# Patient Record
Sex: MALE | Race: WHITE | NOT HISPANIC OR LATINO | Employment: FULL TIME | URBAN - METROPOLITAN AREA
[De-identification: names, ages, dates, MRNs, and addresses within clinical notes are randomized per-mention and may not be internally consistent; named-entity substitution may affect disease eponyms.]

---

## 2017-04-24 ENCOUNTER — TRANSFERRED RECORDS (OUTPATIENT)
Dept: HEALTH INFORMATION MANAGEMENT | Facility: CLINIC | Age: 52
End: 2017-04-24

## 2017-05-26 ENCOUNTER — OFFICE VISIT (OUTPATIENT)
Dept: INTERNAL MEDICINE | Facility: CLINIC | Age: 52
End: 2017-05-26
Payer: COMMERCIAL

## 2017-05-26 VITALS
WEIGHT: 168 LBS | HEIGHT: 69 IN | OXYGEN SATURATION: 98 % | SYSTOLIC BLOOD PRESSURE: 128 MMHG | HEART RATE: 65 BPM | TEMPERATURE: 97.8 F | BODY MASS INDEX: 24.88 KG/M2 | DIASTOLIC BLOOD PRESSURE: 68 MMHG

## 2017-05-26 DIAGNOSIS — R20.2 PARESTHESIA: ICD-10-CM

## 2017-05-26 DIAGNOSIS — E78.00 HYPERCHOLESTEREMIA: Primary | ICD-10-CM

## 2017-05-26 DIAGNOSIS — R79.89 LOW TESTOSTERONE: ICD-10-CM

## 2017-05-26 DIAGNOSIS — E78.5 HYPERLIPIDEMIA LDL GOAL <130: ICD-10-CM

## 2017-05-26 DIAGNOSIS — N52.9 ERECTILE DYSFUNCTION, UNSPECIFIED ERECTILE DYSFUNCTION TYPE: ICD-10-CM

## 2017-05-26 DIAGNOSIS — Z00.00 ROUTINE GENERAL MEDICAL EXAMINATION AT A HEALTH CARE FACILITY: ICD-10-CM

## 2017-05-26 LAB
ERYTHROCYTE [DISTWIDTH] IN BLOOD BY AUTOMATED COUNT: 13.4 % (ref 10–15)
HCT VFR BLD AUTO: 44 % (ref 40–53)
HGB BLD-MCNC: 15 G/DL (ref 13.3–17.7)
MCH RBC QN AUTO: 31.2 PG (ref 26.5–33)
MCHC RBC AUTO-ENTMCNC: 34.1 G/DL (ref 31.5–36.5)
MCV RBC AUTO: 92 FL (ref 78–100)
PLATELET # BLD AUTO: 195 10E9/L (ref 150–450)
RBC # BLD AUTO: 4.81 10E12/L (ref 4.4–5.9)
VIT B12 SERPL-MCNC: 1968 PG/ML (ref 193–986)
WBC # BLD AUTO: 5.3 10E9/L (ref 4–11)

## 2017-05-26 PROCEDURE — 36415 COLL VENOUS BLD VENIPUNCTURE: CPT | Performed by: INTERNAL MEDICINE

## 2017-05-26 PROCEDURE — 80053 COMPREHEN METABOLIC PANEL: CPT | Performed by: INTERNAL MEDICINE

## 2017-05-26 PROCEDURE — 85027 COMPLETE CBC AUTOMATED: CPT | Performed by: INTERNAL MEDICINE

## 2017-05-26 PROCEDURE — G0103 PSA SCREENING: HCPCS | Performed by: INTERNAL MEDICINE

## 2017-05-26 PROCEDURE — 82607 VITAMIN B-12: CPT | Performed by: INTERNAL MEDICINE

## 2017-05-26 PROCEDURE — 80061 LIPID PANEL: CPT | Performed by: INTERNAL MEDICINE

## 2017-05-26 PROCEDURE — 84270 ASSAY OF SEX HORMONE GLOBUL: CPT | Performed by: INTERNAL MEDICINE

## 2017-05-26 PROCEDURE — 84403 ASSAY OF TOTAL TESTOSTERONE: CPT | Performed by: INTERNAL MEDICINE

## 2017-05-26 PROCEDURE — 99214 OFFICE O/P EST MOD 30 MIN: CPT | Performed by: INTERNAL MEDICINE

## 2017-05-26 PROCEDURE — 84443 ASSAY THYROID STIM HORMONE: CPT | Performed by: INTERNAL MEDICINE

## 2017-05-26 RX ORDER — SILDENAFIL 100 MG/1
50-100 TABLET, FILM COATED ORAL DAILY PRN
Qty: 12 TABLET | Refills: 11 | Status: SHIPPED | OUTPATIENT
Start: 2017-05-26 | End: 2018-06-22

## 2017-05-26 RX ORDER — SIMVASTATIN 20 MG
20 TABLET ORAL AT BEDTIME
Qty: 90 TABLET | Refills: 3 | Status: SHIPPED | OUTPATIENT
Start: 2017-05-26 | End: 2018-03-21

## 2017-05-26 RX ORDER — TESTOSTERONE GEL, 1% 10 MG/G
3 GEL TRANSDERMAL DAILY
Qty: 150 G | Refills: 5 | COMMUNITY
Start: 2017-05-26 | End: 2021-12-03

## 2017-05-26 RX ORDER — SILDENAFIL 100 MG/1
50-100 TABLET, FILM COATED ORAL DAILY PRN
Qty: 12 TABLET | Refills: 3 | Status: SHIPPED | OUTPATIENT
Start: 2017-05-26 | End: 2018-06-22

## 2017-05-26 NOTE — MR AVS SNAPSHOT
"              After Visit Summary   5/26/2017    Anant Mena    MRN: [id number]           Patient Information     Date Of Birth          1965        Visit Information        Provider Department      5/26/2017 11:00 AM Elgin Patel MD [facility]        Today's Diagnoses     Hypercholesteremia    -  1    Erectile dysfunction, unspecified erectile dysfunction type        Paresthesia        Routine general medical examination at a health care facility        Hyperlipidemia LDL goal <130        Low testosterone           Follow-ups after your visit        Who to contact     If you have questions or need follow up information about today's clinic visit or your schedule please contact [facility] directly at [phone].  Normal or non-critical lab and imaging results will be communicated to you by MyChart, letter or phone within 4 business days after the clinic has received the results. If you do not hear from us within 7 days, please contact the clinic through MyChart or phone. If you have a critical or abnormal lab result, we will notify you by phone as soon as possible.  Submit refill requests through [email or web address] or call your pharmacy and they will forward the refill request to us. Please allow 3 business days for your refill to be completed.          Additional Information About Your Visit        MyChart Information     [email or web address] lets you send messages to your doctor, view your test results, renew your prescriptions, schedule appointments and more. To sign up, go to [email or web address] . Click on \"Log in\" on the left side of the screen, which will take you to the Welcome page. Then click on \"Sign up Now\" on the right side of the page.     You will be asked to enter the access code listed below, as well as some personal information. Please follow the directions to create your username and password.     Your access code is: [email or web address]  Expires: 8/24/2017 11:36 AM     Your " "access code will  in 90 days. If you need help or a new code, please call your [place] clinic or [phone].        Care EveryWhere ID     This is your Care EveryWhere ID. This could be used by other organizations to access your [place] medical records  [id number]        Your Vitals Were     Pulse Temperature Height Pulse Oximetry BMI (Body Mass Index)       65 97.8  F (36.6  C) (Oral) 5' 8.5\" (1.74 m) 98% 25.17 kg/m2        Blood Pressure from Last 3 Encounters:   17 128/68   16 116/72   05/22/15 126/78    Weight from Last 3 Encounters:   17 168 lb (76.2 kg)   16 168 lb 9.6 oz (76.5 kg)   05/22/15 169 lb 1.6 oz (76.7 kg)              We Performed the Following     CBC with platelets     Comprehensive metabolic panel     Lipid panel reflex to direct LDL     Prostate spec antigen screen     Testosterone Free and Total     TSH with free T4 reflex     Vitamin B12          Today's Medication Changes          These changes are accurate as of: 17 11:36 AM.  If you have any questions, ask your nurse or doctor.               Start taking these medicines.        Dose/Directions    * sildenafil 100 MG cap/tab   Commonly known as:  REVATIO/VIAGRA   Used for:  Hypercholesteremia   Started by:  Elgin Patel MD        Dose:   mg   Take 0.5-1 tablets ( mg) by mouth daily as needed for erectile dysfunction Take 30 min to 4 hours before intercourse.  Never use with nitroglycerin, terazosin or doxazosin.   Quantity:  12 tablet   Refills:  3       * sildenafil 100 MG cap/tab   Commonly known as:  REVATIO/VIAGRA   Used for:  Erectile dysfunction, unspecified erectile dysfunction type   Started by:  Elgin Patel MD        Dose:   mg   Take 0.5-1 tablets ( mg) by mouth daily as needed for erectile dysfunction Take 30 min to 4 hours before intercourse.  Never use with nitroglycerin, terazosin or doxazosin.   Quantity:  12 tablet   Refills:  11       * Notice:  " This list has 2 medication(s) that are the same as other medications prescribed for you. Read the directions carefully, and ask your doctor or other care provider to review them with you.         Where to get your medicines      These medications were sent to [facility] - [place], [place] - [address] AT Portal to [facility]  [address], [place] [address]     Phone:  [phone]     simvastatin 20 MG tablet         Some of these will need a paper prescription and others can be bought over the counter.  Ask your nurse if you have questions.     Bring a paper prescription for each of these medications     sildenafil 100 MG cap/tab    sildenafil 100 MG cap/tab                Primary Care Provider Office Phone # Fax #    Elgin Patel [phone]       [facility] [address] [facility] [address]        Thank you!     Thank you for choosing [facility]  for your care. Our goal is always to provide you with excellent care. Hearing back from our patients is one way we can continue to improve our services. Please take a few minutes to complete the written survey that you may receive in the mail after your visit with us. Thank you!             Your Updated Medication List - Protect others around you: Learn how to safely use, store and throw away your medicines at www.disposemymeds.org.          This list is accurate as of: 5/26/17 11:36 AM.  Always use your most recent med list.                   Brand Name Dispense Instructions for use    ANDROGEL 1% PUMP 12.5 MG/ACT (1%) gel   Generic drug:  testosterone     150 g    Place 3 pumps (37.5 mg) onto the skin daily Apply from dispenser to clean, dry, intact skin of the shoulders, upper arms, or abdomen.       aspirin 81 MG tablet      Take by mouth daily       cetirizine 10 MG tablet    ZYRTEC    30 tablet    Take 1 tablet by mouth daily as needed.       cholecalciferol  1000 UNIT tablet    vitamin D     Take 1 tablet by mouth daily.       CIALIS PO      Take 5 mg by mouth daily       FISH OIL PO      Take  by mouth.       fluticasone 50 MCG/ACT spray    FLONASE    3 Package    Spray 1-2 sprays into both nostrils daily.       ibuprofen 200 MG capsule     120 capsule    Take 200 mg by mouth every 4 hours as needed for fever       ketoconazole 2 % shampoo    NIZORAL         metroNIDAZOLE 1 % gel    METROGEL    60 g    Apply topically daily       omeprazole 20 MG tablet    priLOSEC OTC    30 tablet    Take 1 tablet (20 mg) by mouth daily as needed       ranitidine 150 MG tablet    ZANTAC    60 tablet    Take 1 tablet (150 mg) by mouth 2 times daily       * sildenafil 100 MG cap/tab    REVATIO/VIAGRA    12 tablet    Take 0.5-1 tablets ( mg) by mouth daily as needed for erectile dysfunction Take 30 min to 4 hours before intercourse.  Never use with nitroglycerin, terazosin or doxazosin.       * sildenafil 100 MG cap/tab    REVATIO/VIAGRA    12 tablet    Take 0.5-1 tablets ( mg) by mouth daily as needed for erectile dysfunction Take 30 min to 4 hours before intercourse.  Never use with nitroglycerin, terazosin or doxazosin.       simvastatin 20 MG tablet    ZOCOR    90 tablet    Take 1 tablet (20 mg) by mouth At Bedtime at bedtime.       * Notice:  This list has 2 medication(s) that are the same as other medications prescribed for you. Read the directions carefully, and ask your doctor or other care provider to review them with you.

## 2017-05-26 NOTE — LETTER
[facility]  [address], [address]  [place]  [address]      May 31, 2017    Anant Mena                                                                                                                                                       [address] [facility] [address]              Dear Anant,      The results of your recent tests were within normal.      Enclosed is a copy of the results.  It was a pleasure to see you at your last appointment.    If you have any questions or concerns, please contact our office at [phone].        Sincerely,      Elgin Patel M.D.

## 2017-05-26 NOTE — PROGRESS NOTES
SUBJECTIVE:                                                    Anant Mena is a 51 year old male who presents to clinic today for the following health issues:      Hyperlipidemia Follow-Up      Rate your low fat/cholesterol diet?: fair    Taking statin?  Yes, no muscle aches from statin    Other lipid medications/supplements?:  Fish oil/Omega 3, without side effects   Has H/O hyperlipidemia. On medical treatment and diet. No side effects. No muscle weakness, myalgias or upset stomach.     Has h/o GERD on Zantac treatment. symptoms are controlled. No nausea, vomiting, heartburns, bloating.      Amount of exercise or physical activity: 3 days/week for an average of 50 minutes    Problems taking medications regularly: No    Medication side effects: none    Diet: low fat/cholesterol      PROBLEMS TO ADD ON...  Has h/o ED and low testosterone. Follows with urology. On Androgel, Cialis, PRN Viagra.     Problem list and histories reviewed & adjusted, as indicated.  Additional history: as documented    Patient Active Problem List   Diagnosis     Hypercholesteremia     GERD (gastroesophageal reflux disease)     Back pain     Insomnia     Low back pain     Lumbar radiculopathy     HYPERLIPIDEMIA LDL GOAL <130     Anxiety     Low testosterone     Past Surgical History:   Procedure Laterality Date     COLONOSCOPY  12/24/2013    Procedure: COLONOSCOPY;  COLONOSCOPY       ESOPHAGOSCOPY, GASTROSCOPY, DUODENOSCOPY (EGD), COMBINED  8/16/2013    Procedure: COMBINED ESOPHAGOSCOPY, GASTROSCOPY, DUODENOSCOPY (EGD);  ESOPHAGOSCOPY, GASTROSCOPY, DUODENOSCOPY ;  Surgeon: Randy Ohara MD;  Location:  GI     KNEE SURGERY  4/2015    Lt Meniscus Repair : Dr. Philip Slade       Social History   Substance Use Topics     Smoking status: Never Smoker     Smokeless tobacco: Never Used     Alcohol use Yes      Comment: ocas     Family History   Problem Relation Age of Onset     OSTEOPOROSIS Mother      born 1936 and HTN     Alzheimer  Disease Father      born 1931and DM     DIABETES Brother      Breast Cancer Sister      BRACA 2 positive     Family History Negative Sister      Family History Negative Sister      Family History Negative Sister      Family History Negative Daughter      Respiratory Daughter      asthma         Current Outpatient Prescriptions   Medication Sig Dispense Refill     simvastatin (ZOCOR) 20 MG tablet Take 1 tablet (20 mg) by mouth At Bedtime at bedtime. 90 tablet 3     sildenafil (REVATIO/VIAGRA) 100 MG cap/tab Take 0.5-1 tablets ( mg) by mouth daily as needed for erectile dysfunction Take 30 min to 4 hours before intercourse.  Never use with nitroglycerin, terazosin or doxazosin. 12 tablet 3     sildenafil (REVATIO/VIAGRA) 100 MG cap/tab Take 0.5-1 tablets ( mg) by mouth daily as needed for erectile dysfunction Take 30 min to 4 hours before intercourse.  Never use with nitroglycerin, terazosin or doxazosin. 12 tablet 11     Tadalafil (CIALIS PO) Take 5 mg by mouth daily       ketoconazole (NIZORAL) 2 % shampoo   5     aspirin 81 MG tablet Take by mouth daily       ranitidine (ZANTAC) 150 MG tablet Take 1 tablet (150 mg) by mouth 2 times daily 60 tablet 3     omeprazole (PRILOSEC OTC) 20 MG tablet Take 1 tablet (20 mg) by mouth daily as needed 30 tablet 5     ibuprofen 200 MG capsule Take 200 mg by mouth every 4 hours as needed for fever 120 capsule 3     metroNIDAZOLE (METROGEL) 1 % gel Apply topically daily 60 g 11     fluticasone (FLONASE) 50 MCG/ACT nasal spray Spray 1-2 sprays into both nostrils daily. 3 Package 3     cetirizine (ZYRTEC) 10 MG tablet Take 1 tablet by mouth daily as needed. 30 tablet 5     Omega-3 Fatty Acids (FISH OIL PO) Take  by mouth.       cholecalciferol (VITAMIN D) 1000 UNIT tablet Take 1 tablet by mouth daily.       [DISCONTINUED] simvastatin (ZOCOR) 20 MG tablet Take 1 tablet (20 mg) by mouth At Bedtime at bedtime. 90 tablet 3       Reviewed and updated as needed this visit by  "clinical staff       Reviewed and updated as needed this visit by Provider         ROS:  Constitutional, HEENT, cardiovascular, pulmonary, gi and gu systems are negative, except as otherwise noted.    OBJECTIVE:                                                    /68  Pulse 65  Temp 97.8  F (36.6  C) (Oral)  Ht 5' 8.5\" (1.74 m)  Wt 168 lb (76.2 kg)  SpO2 98%  BMI 25.17 kg/m2  Body mass index is 25.17 kg/(m^2).  GENERAL: healthy, alert and no distress  NECK: no adenopathy, no asymmetry, masses, or scars and thyroid normal to palpation  RESP: lungs clear to auscultation - no rales, rhonchi or wheezes  CV: regular rate and rhythm, normal S1 S2, no S3 or S4, no murmur, click or rub, no peripheral edema and peripheral pulses strong  ABDOMEN: soft, nontender, no hepatosplenomegaly, no masses and bowel sounds normal  MS: no gross musculoskeletal defects noted, no edema    Diagnostic Test Results:  none      ASSESSMENT/PLAN:                                                      Problem List Items Addressed This Visit     Hypercholesteremia - Primary    Relevant Medications    simvastatin (ZOCOR) 20 MG tablet    sildenafil (REVATIO/VIAGRA) 100 MG cap/tab    HYPERLIPIDEMIA LDL GOAL <130    Relevant Medications    simvastatin (ZOCOR) 20 MG tablet    Low testosterone    Relevant Orders    Testosterone Free and Total      Other Visit Diagnoses     Erectile dysfunction, unspecified erectile dysfunction type        Relevant Medications    sildenafil (REVATIO/VIAGRA) 100 MG cap/tab    Paresthesia        Routine general medical examination at a health care facility               Assess lab work   Cont treatment  Follow up with urology     Follow-Up:yearly     Elgin Patel MD  [facility]    "

## 2017-05-26 NOTE — NURSING NOTE
"Chief Complaint   Patient presents with     Recheck Medication     Fasting Px, Last Px 07/22/16, Need to complete labs from last year        Initial /68  Pulse 65  Temp 97.8  F (36.6  C) (Oral)  Ht 5' 8.5\" (1.74 m)  Wt 168 lb (76.2 kg)  SpO2 98%  BMI 25.17 kg/m2 Estimated body mass index is 25.17 kg/(m^2) as calculated from the following:    Height as of this encounter: 5' 8.5\" (1.74 m).    Weight as of this encounter: 168 lb (76.2 kg).  Medication Reconciliation: complete   Liza Norton MA      "

## 2017-05-27 LAB
ALBUMIN SERPL-MCNC: 4.4 G/DL (ref 3.4–5)
ALP SERPL-CCNC: 56 U/L (ref 40–150)
ALT SERPL W P-5'-P-CCNC: 25 U/L (ref 0–70)
ANION GAP SERPL CALCULATED.3IONS-SCNC: 7 MMOL/L (ref 3–14)
AST SERPL W P-5'-P-CCNC: 20 U/L (ref 0–45)
BILIRUB SERPL-MCNC: 0.6 MG/DL (ref 0.2–1.3)
BUN SERPL-MCNC: 19 MG/DL (ref 7–30)
CALCIUM SERPL-MCNC: 8.9 MG/DL (ref 8.5–10.1)
CHLORIDE SERPL-SCNC: 107 MMOL/L (ref 94–109)
CHOLEST SERPL-MCNC: 168 MG/DL
CO2 SERPL-SCNC: 26 MMOL/L (ref 20–32)
CREAT SERPL-MCNC: 1.04 MG/DL (ref 0.66–1.25)
GFR SERPL CREATININE-BSD FRML MDRD: 75 ML/MIN/1.7M2
GLUCOSE SERPL-MCNC: 89 MG/DL (ref 70–99)
HDLC SERPL-MCNC: 54 MG/DL
LDLC SERPL CALC-MCNC: 100 MG/DL
NONHDLC SERPL-MCNC: 114 MG/DL
POTASSIUM SERPL-SCNC: 4.2 MMOL/L (ref 3.4–5.3)
PROT SERPL-MCNC: 7.2 G/DL (ref 6.8–8.8)
PSA SERPL-ACNC: 0.67 UG/L (ref 0–4)
SODIUM SERPL-SCNC: 140 MMOL/L (ref 133–144)
TRIGL SERPL-MCNC: 69 MG/DL
TSH SERPL DL<=0.005 MIU/L-ACNC: 1.86 MU/L (ref 0.4–4)

## 2017-05-31 LAB
SHBG SERPL-SCNC: 25 NMOL/L (ref 11–80)
TESTOST FREE SERPL-MCNC: 32.7 NG/DL (ref 4.7–24.4)
TESTOST SERPL-MCNC: 1150 NG/DL (ref 240–950)

## 2017-08-04 ENCOUNTER — TRANSFERRED RECORDS (OUTPATIENT)
Dept: HEALTH INFORMATION MANAGEMENT | Facility: CLINIC | Age: 52
End: 2017-08-04

## 2018-03-21 DIAGNOSIS — E78.00 HYPERCHOLESTEREMIA: ICD-10-CM

## 2018-03-23 RX ORDER — SIMVASTATIN 20 MG
TABLET ORAL
Qty: 90 TABLET | Refills: 0 | Status: SHIPPED | OUTPATIENT
Start: 2018-03-23 | End: 2018-06-22

## 2018-03-23 NOTE — TELEPHONE ENCOUNTER
"Requested Prescriptions   Pending Prescriptions Disp Refills     simvastatin (ZOCOR) 20 MG tablet [Pharmacy Med Name: SIMVASTATIN  TAB 20MG] 90 tablet 3     Sig: TAKE 1 TABLET AT BEDTIME    Statins Protocol Passed    3/21/2018  6:02 PM       Passed - LDL on file in past 12 months    Recent Labs   Lab Test  05/26/17   1135   LDL  100*            Passed - No abnormal creatine kinase in past 12 months    No lab results found.            Passed - Recent (12 mo) or future (30 days) visit within the authorizing provider's specialty    Patient had office visit in the last 12 months or has a visit in the next 30 days with authorizing provider or within the authorizing provider's specialty.  See \"Patient Info\" tab in inbasket, or \"Choose Columns\" in Meds & Orders section of the refill encounter.           Passed - Patient is age 18 or older      Routing refill request to provider for review/approval because:  Drug interaction warning VERY HIGH with Ketoconazole Shampoo,            "

## 2018-04-27 ENCOUNTER — TRANSFERRED RECORDS (OUTPATIENT)
Dept: HEALTH INFORMATION MANAGEMENT | Facility: CLINIC | Age: 53
End: 2018-04-27

## 2018-06-22 ENCOUNTER — OFFICE VISIT (OUTPATIENT)
Dept: INTERNAL MEDICINE | Facility: CLINIC | Age: 53
End: 2018-06-22
Payer: COMMERCIAL

## 2018-06-22 VITALS
HEART RATE: 67 BPM | RESPIRATION RATE: 16 BRPM | HEIGHT: 69 IN | BODY MASS INDEX: 24.88 KG/M2 | OXYGEN SATURATION: 98 % | DIASTOLIC BLOOD PRESSURE: 80 MMHG | TEMPERATURE: 97.9 F | WEIGHT: 168 LBS | SYSTOLIC BLOOD PRESSURE: 120 MMHG

## 2018-06-22 DIAGNOSIS — E78.00 HYPERCHOLESTEREMIA: ICD-10-CM

## 2018-06-22 DIAGNOSIS — E78.5 HYPERLIPIDEMIA LDL GOAL <130: ICD-10-CM

## 2018-06-22 DIAGNOSIS — Z12.5 SCREENING FOR PROSTATE CANCER: ICD-10-CM

## 2018-06-22 DIAGNOSIS — Z00.00 ROUTINE GENERAL MEDICAL EXAMINATION AT A HEALTH CARE FACILITY: Primary | ICD-10-CM

## 2018-06-22 DIAGNOSIS — M62.838 NECK MUSCLE SPASM: ICD-10-CM

## 2018-06-22 LAB
ALBUMIN SERPL-MCNC: 4.8 G/DL (ref 3.4–5)
ALP SERPL-CCNC: 64 U/L (ref 40–150)
ALT SERPL W P-5'-P-CCNC: 34 U/L (ref 0–70)
ANION GAP SERPL CALCULATED.3IONS-SCNC: 9 MMOL/L (ref 3–14)
AST SERPL W P-5'-P-CCNC: 26 U/L (ref 0–45)
BILIRUB SERPL-MCNC: 0.7 MG/DL (ref 0.2–1.3)
BUN SERPL-MCNC: 20 MG/DL (ref 7–30)
CALCIUM SERPL-MCNC: 9.1 MG/DL (ref 8.5–10.1)
CHLORIDE SERPL-SCNC: 105 MMOL/L (ref 94–109)
CHOLEST SERPL-MCNC: 209 MG/DL
CO2 SERPL-SCNC: 26 MMOL/L (ref 20–32)
CREAT SERPL-MCNC: 1.11 MG/DL (ref 0.66–1.25)
ERYTHROCYTE [DISTWIDTH] IN BLOOD BY AUTOMATED COUNT: 13 % (ref 10–15)
GFR SERPL CREATININE-BSD FRML MDRD: 69 ML/MIN/1.7M2
GLUCOSE SERPL-MCNC: 106 MG/DL (ref 70–99)
HCT VFR BLD AUTO: 48.5 % (ref 40–53)
HDLC SERPL-MCNC: 54 MG/DL
HGB BLD-MCNC: 16.2 G/DL (ref 13.3–17.7)
LDLC SERPL CALC-MCNC: 129 MG/DL
MCH RBC QN AUTO: 30.8 PG (ref 26.5–33)
MCHC RBC AUTO-ENTMCNC: 33.4 G/DL (ref 31.5–36.5)
MCV RBC AUTO: 92 FL (ref 78–100)
NONHDLC SERPL-MCNC: 155 MG/DL
PLATELET # BLD AUTO: 172 10E9/L (ref 150–450)
POTASSIUM SERPL-SCNC: 4.2 MMOL/L (ref 3.4–5.3)
PROT SERPL-MCNC: 8 G/DL (ref 6.8–8.8)
PSA SERPL-ACNC: 0.91 UG/L (ref 0–4)
RBC # BLD AUTO: 5.26 10E12/L (ref 4.4–5.9)
SODIUM SERPL-SCNC: 140 MMOL/L (ref 133–144)
TRIGL SERPL-MCNC: 129 MG/DL
WBC # BLD AUTO: 8.2 10E9/L (ref 4–11)

## 2018-06-22 PROCEDURE — 99396 PREV VISIT EST AGE 40-64: CPT | Performed by: PHYSICIAN ASSISTANT

## 2018-06-22 PROCEDURE — 80053 COMPREHEN METABOLIC PANEL: CPT | Performed by: PHYSICIAN ASSISTANT

## 2018-06-22 PROCEDURE — G0103 PSA SCREENING: HCPCS | Performed by: PHYSICIAN ASSISTANT

## 2018-06-22 PROCEDURE — 85027 COMPLETE CBC AUTOMATED: CPT | Performed by: PHYSICIAN ASSISTANT

## 2018-06-22 PROCEDURE — 80061 LIPID PANEL: CPT | Performed by: PHYSICIAN ASSISTANT

## 2018-06-22 PROCEDURE — 36415 COLL VENOUS BLD VENIPUNCTURE: CPT | Performed by: PHYSICIAN ASSISTANT

## 2018-06-22 RX ORDER — SIMVASTATIN 20 MG
20 TABLET ORAL AT BEDTIME
Qty: 90 TABLET | Refills: 3 | Status: SHIPPED | OUTPATIENT
Start: 2018-06-22 | End: 2019-08-02

## 2018-06-22 RX ORDER — SILDENAFIL 100 MG/1
50-100 TABLET, FILM COATED ORAL DAILY PRN
Qty: 12 TABLET | Refills: 3 | Status: SHIPPED | OUTPATIENT
Start: 2018-06-22 | End: 2019-08-23

## 2018-06-22 RX ORDER — CYCLOBENZAPRINE HCL 10 MG
5-10 TABLET ORAL
Qty: 30 TABLET | Refills: 3 | Status: SHIPPED | OUTPATIENT
Start: 2018-06-22 | End: 2022-10-07

## 2018-06-22 NOTE — MR AVS SNAPSHOT
After Visit Summary   6/22/2018    Anant Mena    MRN: [id number]           Patient Information     Date Of Birth          1965        Visit Information        Provider Department      6/22/2018 8:00 AM Anna Marie Omalley PA-C [facility]        Today's Diagnoses     Routine general medical examination at a health care facility    -  1    Hypercholesteremia        Hyperlipidemia LDL goal <130        Screening for prostate cancer        Neck muscle spasm          Care Instructions      Preventive Health Recommendations  Male Ages 50 - 64    Yearly exam:             See your health care provider every year in order to  o   Review health changes.   o   Discuss preventive care.    o   Review your medicines if your doctor has prescribed any.     Have a cholesterol test every 5 years, or more frequently if you are at risk for high cholesterol/heart disease.     Have a diabetes test (fasting glucose) every three years. If you are at risk for diabetes, you should have this test more often.     Have a colonoscopy at age 50, or have a yearly FIT test (stool test). These exams will check for colon cancer.      Talk with your health care provider about whether or not a prostate cancer screening test (PSA) is right for you.    You should be tested each year for STDs (sexually transmitted diseases), if you re at risk.     Shots: Get a flu shot each year. Get a tetanus shot every 10 years.     Nutrition:    Eat at least 5 servings of fruits and vegetables daily.     Eat whole-grain bread, whole-wheat pasta and brown rice instead of white grains and rice.     Talk to your provider about Calcium and Vitamin D.     Lifestyle    Exercise for at least 150 minutes a week (30 minutes a day, 5 days a week). This will help you control your weight and prevent disease.     Limit alcohol to one drink per day.     No smoking.     Wear sunscreen to prevent skin cancer.     See your dentist every six months  "for an exam and cleaning.     See your eye doctor every 1 to 2 years.            Follow-ups after your visit        Follow-up notes from your care team     Return in about 1 year (around 6/22/2019) for Physical Exam.      Who to contact     If you have questions or need follow up information about today's clinic visit or your schedule please contact [facility] directly at [phone].  Normal or non-critical lab and imaging results will be communicated to you by MyChart, letter or phone within 4 business days after the clinic has received the results. If you do not hear from us within 7 days, please contact the clinic through [employer] or phone. If you have a critical or abnormal lab result, we will notify you by phone as soon as possible.  Submit refill requests through [employer] or call your pharmacy and they will forward the refill request to us. Please allow 3 business days for your refill to be completed.          Additional Information About Your Visit        Care EveryWhere ID     This is your Care EveryWhere ID. This could be used by other organizations to access your [facility] medical records  [id number]        Your Vitals Were     Pulse Temperature Respirations Height Pulse Oximetry BMI (Body Mass Index)    67 97.9  F (36.6  C) (Oral) 16 5' 8.5\" (1.74 m) 98% 25.17 kg/m2       Blood Pressure from Last 3 Encounters:   06/22/18 120/80   05/26/17 128/68   07/22/16 116/72    Weight from Last 3 Encounters:   06/22/18 168 lb (76.2 kg)   05/26/17 168 lb (76.2 kg)   07/22/16 168 lb 9.6 oz (76.5 kg)              We Performed the Following     CBC with platelets     Comprehensive metabolic panel     Lipid panel reflex to direct LDL Fasting     PSA, screen          Today's Medication Changes          These changes are accurate as of 6/22/18  8:23 AM.  If you have any questions, ask your nurse or doctor.               Start taking these medicines.        Dose/Directions    cyclobenzaprine 10 MG tablet "   Commonly known as:  FLEXERIL   Used for:  Neck muscle spasm   Started by:  Anna Marie Omalley PA-C        Dose:  5-10 mg   Take 0.5-1 tablets (5-10 mg) by mouth nightly as needed for muscle spasms   Quantity:  30 tablet   Refills:  3         These medicines have changed or have updated prescriptions.        Dose/Directions    sildenafil 100 MG tablet   Commonly known as:  VIAGRA   This may have changed:    - reasons to take this  - additional instructions  - Another medication with the same name was removed. Continue taking this medication, and follow the directions you see here.   Used for:  Hypercholesteremia   Changed by:  Anna Marie Omalley PA-C        Dose:   mg   Take 0.5-1 tablets ( mg) by mouth daily as needed 30 min-4 hrs before intercourse.Never use with nitroglycerin,terazosin or doxazosin.   Quantity:  12 tablet   Refills:  3       simvastatin 20 MG tablet   Commonly known as:  ZOCOR   This may have changed:  See the new instructions.   Used for:  Hypercholesteremia   Changed by:  Anna Marie Omalley PA-C        Dose:  20 mg   Take 1 tablet (20 mg) by mouth At Bedtime   Quantity:  90 tablet   Refills:  3            Where to get your medicines      These medications were sent to [facility] - [place], [place] - [address] AT [facility] to [email or web address]  [address] [facility] [address]     Phone:  [phone]     cyclobenzaprine 10 MG tablet    sildenafil 100 MG tablet    simvastatin 20 MG tablet                Primary Care Provider Office Phone # Fax #    Elgin Patel [phone]       [address]  [facility] [address]        Equal Access to Services     [facility] CenterANNALISE : Hadii gaudencio Ramírez, waaxda luqadaha, qaybta kaalmada mark, dontae garces. So [facility] [phone].    ATENCIÓN: Si habla español, tiene a hodgson disposición servicios gratuitos de asistencia lingüística. Llame al  [phone].    We comply with applicable federal civil rights laws and [place] laws. We do not discriminate on the basis of race, color, national origin, age, disability, sex, sexual orientation, or gender identity.            Thank you!     Thank you for choosing [facility]  for your care. Our goal is always to provide you with excellent care. Hearing back from our patients is one way we can continue to improve our services. Please take a few minutes to complete the written survey that you may receive in the mail after your visit with us. Thank you!             Your Updated Medication List - Protect others around you: Learn how to safely use, store and throw away your medicines at www.disposemymeds.org.          This list is accurate as of 6/22/18  8:23 AM.  Always use your most recent med list.                   Brand Name Dispense Instructions for use Diagnosis    ANDROGEL 1% PUMP 12.5 MG/ACT (1%) gel   Generic drug:  testosterone     150 g    Place 3 pumps (37.5 mg) onto the skin daily Apply from dispenser to clean, dry, intact skin of the shoulders, upper arms, or abdomen.        aspirin 81 MG tablet      Take by mouth daily        cetirizine 10 MG tablet    ZYRTEC    30 tablet    Take 1 tablet by mouth daily as needed.    Chronic rhinitis       cholecalciferol 1000 UNIT tablet    vitamin D3     Take 1 tablet by mouth daily.        CIALIS PO      Take 5 mg by mouth daily        cyclobenzaprine 10 MG tablet    FLEXERIL    30 tablet    Take 0.5-1 tablets (5-10 mg) by mouth nightly as needed for muscle spasms    Neck muscle spasm       FISH OIL PO      Take  by mouth.        fluticasone 50 MCG/ACT spray    FLONASE    3 Package    Spray 1-2 sprays into both nostrils daily.    Postnasal drip       ibuprofen 200 MG capsule     120 capsule    Take 200 mg by mouth every 4 hours as needed for fever    LBP (low back pain)       ketoconazole 2 % shampoo    NIZORAL          metroNIDAZOLE 1 % gel     METROGEL    60 g    Apply topically daily    Rosacea       omeprazole 20 MG tablet    priLOSEC OTC    30 tablet    Take 1 tablet (20 mg) by mouth daily as needed    GERD (gastroesophageal reflux disease)       ranitidine 150 MG tablet    ZANTAC    60 tablet    Take 1 tablet (150 mg) by mouth 2 times daily    GERD (gastroesophageal reflux disease)       sildenafil 100 MG tablet    VIAGRA    12 tablet    Take 0.5-1 tablets ( mg) by mouth daily as needed 30 min-4 hrs before intercourse.Never use with nitroglycerin,terazosin or doxazosin.    Hypercholesteremia       simvastatin 20 MG tablet    ZOCOR    90 tablet    Take 1 tablet (20 mg) by mouth At Bedtime    Hypercholesteremia

## 2018-06-22 NOTE — PROGRESS NOTES
SUBJECTIVE:   CC: Anant Mena is an 52 year old male who presents for preventative health visit.     Physical   Annual:     Getting at least 3 servings of Calcium per day::  NO    Bi-annual eye exam::  Yes    Dental care twice a year::  Yes    Sleep apnea or symptoms of sleep apnea::  Sleep apnea    Diet::  Regular (no restrictions)    Frequency of exercise::  2-3 days/week    Duration of exercise::  30-45 minutes    Taking medications regularly::  Yes    Medication side effects::  None    Additional concerns today::  No            Patient is here in clinic for a general physical and fasting blood work as well as refills of his medication. His only concern today is some muscle spasm in the neck and low back that has been going on for the last few weeks. No specific recent injury, he has known degenerative changes and slept and moved awkwardly causing some muscle tension    -------------------------------------    Today's PHQ-2 Score:   PHQ-2 ( 1999 Pfizer) 6/22/2018   Q1: Little interest or pleasure in doing things 0   Q2: Feeling down, depressed or hopeless 0   PHQ-2 Score 0   Q1: Little interest or pleasure in doing things Not at all   Q2: Feeling down, depressed or hopeless Not at all   PHQ-2 Score 0       Abuse: Current or Past(Physical, Sexual or Emotional)- No  Do you feel safe in your environment - Yes    Social History   Substance Use Topics     Smoking status: Never Smoker     Smokeless tobacco: Never Used     Alcohol use Yes      Comment: ocas     Alcohol Use 6/22/2018   If you drink alcohol do you typically have greater than 3 drinks per day OR greater than 7 drinks per week? No       Last PSA:   PSA   Date Value Ref Range Status   05/26/2017 0.67 0 - 4 ug/L Final     Comment:     Assay Method:  Chemiluminescence using Siemens Vista analyzer       Reviewed orders with patient. Reviewed health maintenance and updated orders accordingly - Yes    Reviewed and updated as needed this visit by clinical  "staff  Tobacco  Allergies  Meds  Med Hx  Surg Hx  Fam Hx         Reviewed and updated as needed this visit by Provider            Review of Systems  CONSTITUTIONAL: NEGATIVE for fever, chills, change in weight  INTEGUMENTARY/SKIN: NEGATIVE for worrisome rashes, moles or lesions  EYES: NEGATIVE for vision changes or irritation  ENT: NEGATIVE for ear, mouth and throat problems  RESP: NEGATIVE for significant cough or SOB  CV: NEGATIVE for chest pain, palpitations or peripheral edema  GI: NEGATIVE for nausea, abdominal pain, heartburn, or change in bowel habits   male: negative for dysuria, hematuria, decreased urinary stream, erectile dysfunction, urethral discharge  MUSCULOSKELETAL: NEGATIVE for significant arthralgias or myalgia  NEURO: NEGATIVE for weakness, dizziness or paresthesias  PSYCHIATRIC: NEGATIVE for changes in mood or affect    OBJECTIVE:   /80  Pulse 67  Temp 97.9  F (36.6  C) (Oral)  Resp 16  Ht 5' 8.5\" (1.74 m)  Wt 168 lb (76.2 kg)  SpO2 98%  BMI 25.17 kg/m2    Physical Exam  GENERAL: healthy, alert and no distress  EYES: Eyes grossly normal to inspection, PERRL and conjunctivae and sclerae normal  HENT: ear canals and TM's normal, nose and mouth without ulcers or lesions  NECK: no adenopathy, no asymmetry, masses, or scars and thyroid normal to palpation  RESP: lungs clear to auscultation - no rales, rhonchi or wheezes  CV: regular rate and rhythm, normal S1 S2, no S3 or S4, no murmur, click or rub, no peripheral edema and peripheral pulses strong  ABDOMEN: soft, nontender, no hepatosplenomegaly, no masses and bowel sounds normal  MS: no gross musculoskeletal defects noted, no edema  SKIN: no suspicious lesions or rashes  NEURO: Normal strength and tone, mentation intact and speech normal  PSYCH: mentation appears normal, affect normal/bright    ASSESSMENT/PLAN:       ICD-10-CM    1. Routine general medical examination at a health care facility Z00.00 Lipid panel reflex to direct " "LDL Fasting     Comprehensive metabolic panel     CBC with platelets   2. Hypercholesteremia E78.00 simvastatin (ZOCOR) 20 MG tablet     sildenafil (VIAGRA) 100 MG tablet     Lipid panel reflex to direct LDL Fasting   3. Hyperlipidemia LDL goal <130 E78.5 Lipid panel reflex to direct LDL Fasting     Comprehensive metabolic panel     CBC with platelets   4. Screening for prostate cancer Z12.5 PSA, screen   5. Neck muscle spasm M62.838 cyclobenzaprine (FLEXERIL) 10 MG tablet       COUNSELING:   Reviewed preventive health counseling, as reflected in patient instructions       Prostate cancer screening         reports that he has never smoked. He has never used smokeless tobacco.    Estimated body mass index is 25.17 kg/(m^2) as calculated from the following:    Height as of this encounter: 5' 8.5\" (1.74 m).    Weight as of this encounter: 168 lb (76.2 kg).       Counseling Resources:  ATP IV Guidelines  Pooled Cohorts Equation Calculator  FRAX Risk Assessment  ICSI Preventive Guidelines  Dietary Guidelines for Americans, 2010  USDA's MyPlate  ASA Prophylaxis  Lung CA Screening    Anna Marie Omalley PA-C  [facility]  "

## 2018-08-10 ENCOUNTER — TRANSFERRED RECORDS (OUTPATIENT)
Dept: HEALTH INFORMATION MANAGEMENT | Facility: CLINIC | Age: 53
End: 2018-08-10

## 2018-11-16 ENCOUNTER — OFFICE VISIT (OUTPATIENT)
Dept: INTERNAL MEDICINE | Facility: CLINIC | Age: 53
End: 2018-11-16
Payer: COMMERCIAL

## 2018-11-16 VITALS
RESPIRATION RATE: 16 BRPM | WEIGHT: 176 LBS | HEART RATE: 68 BPM | SYSTOLIC BLOOD PRESSURE: 130 MMHG | TEMPERATURE: 98.3 F | DIASTOLIC BLOOD PRESSURE: 74 MMHG | OXYGEN SATURATION: 96 % | HEIGHT: 69 IN | BODY MASS INDEX: 26.07 KG/M2

## 2018-11-16 DIAGNOSIS — M54.17 LUMBOSACRAL RADICULOPATHY: ICD-10-CM

## 2018-11-16 DIAGNOSIS — R79.89 LOW TESTOSTERONE: ICD-10-CM

## 2018-11-16 DIAGNOSIS — M54.12 CERVICAL RADICULOPATHY: Primary | ICD-10-CM

## 2018-11-16 PROCEDURE — 99214 OFFICE O/P EST MOD 30 MIN: CPT | Performed by: INTERNAL MEDICINE

## 2018-11-16 NOTE — MR AVS SNAPSHOT
"              After Visit Summary   11/16/2018    Anant Mena    MRN: [id number]           Patient Information     Date Of Birth          1965        Visit Information        Provider Department      11/16/2018 2:00 PM Elgin Patel MD [facility]        Today's Diagnoses     Cervical radiculopathy    -  1    Lumbosacral radiculopathy        Low testosterone           Follow-ups after your visit        Future tests that were ordered for you today     Open Future Orders        Priority Expected Expires Ordered    MR Cervical Spine w/o Contrast Routine  11/16/2019 11/16/2018            Who to contact     If you have questions or need follow up information about today's clinic visit or your schedule please contact [facility] directly at [phone].  Normal or non-critical lab and imaging results will be communicated to you by MyChart, letter or phone within 4 business days after the clinic has received the results. If you do not hear from us within 7 days, please contact the clinic through MyChart or phone. If you have a critical or abnormal lab result, we will notify you by phone as soon as possible.  Submit refill requests through [employer] or call your pharmacy and they will forward the refill request to us. Please allow 3 business days for your refill to be completed.          Additional Information About Your Visit        Care EveryWhere ID     This is your Care EveryWhere ID. This could be used by other organizations to access your [place] medical records  [id number]        Your Vitals Were     Pulse Temperature Respirations Height Pulse Oximetry BMI (Body Mass Index)    68 98.3  F (36.8  C) (Oral) 16 5' 8.5\" (1.74 m) 96% 26.37 kg/m2       Blood Pressure from Last 3 Encounters:   11/16/18 130/74   06/22/18 120/80   05/26/17 128/68    Weight from Last 3 Encounters:   11/16/18 176 lb (79.8 kg)   06/22/18 168 lb (76.2 kg)   05/26/17 168 lb (76.2 kg)               " Primary Care Provider Office Phone # Fax #    Elgin Patel [phone]       [address] [facility] [address]        Equal Access to Services     KARUNACHERIE MITCH : Hadii gaudencio ku dianno Soedgarali, waaxda luqadaha, qaybta kaalmada adealexandruda, dontae zepedajayme dez. So [facility] [phone].    ATENCIÓN: Si habla español, tiene a hodgson disposición servicios gratuitos de asistencia lingüística. Llame al [phone].    We comply with applicable federal civil rights laws and [place] laws. We do not discriminate on the basis of race, color, national origin, age, disability, sex, sexual orientation, or gender identity.            Thank you!     Thank you for choosing [facility]  for your care. Our goal is always to provide you with excellent care. Hearing back from our patients is one way we can continue to improve our services. Please take a few minutes to complete the written survey that you may receive in the mail after your visit with us. Thank you!             Your Updated Medication List - Protect others around you: Learn how to safely use, store and throw away your medicines at www.disposemymeds.org.          This list is accurate as of 11/16/18  4:32 PM.  Always use your most recent med list.                   Brand Name Dispense Instructions for use Diagnosis    ANDROGEL 1% PUMP 12.5 MG/ACT (1%) gel   Generic drug:  testosterone     150 g    Place 3 pumps (37.5 mg) onto the skin daily Apply from dispenser to clean, dry, intact skin of the shoulders, upper arms, or abdomen.        aspirin 81 MG tablet      Take by mouth daily        cetirizine 10 MG tablet    ZYRTEC    30 tablet    Take 1 tablet by mouth daily as needed.    Chronic rhinitis       cholecalciferol 1000 UNIT tablet    vitamin D3     Take 1 tablet by mouth daily.        CIALIS PO      Take 5 mg by mouth daily        cyclobenzaprine 10 MG tablet    FLEXERIL    30 tablet    Take 0.5-1 tablets  (5-10 mg) by mouth nightly as needed for muscle spasms    Neck muscle spasm       FISH OIL PO      Take  by mouth.        fluticasone 50 MCG/ACT spray    FLONASE    3 Package    Spray 1-2 sprays into both nostrils daily.    Postnasal drip       ibuprofen 200 MG capsule    ADVIL/MOTRIN    120 capsule    Take 200 mg by mouth every 4 hours as needed for fever    LBP (low back pain)       ketoconazole 2 % shampoo    NIZORAL          metroNIDAZOLE 1 % gel    METROGEL    60 g    Apply topically daily    Rosacea       omeprazole 20 MG DR tablet    priLOSEC OTC    30 tablet    Take 1 tablet (20 mg) by mouth daily as needed    GERD (gastroesophageal reflux disease)       ranitidine 150 MG tablet    ZANTAC    60 tablet    Take 1 tablet (150 mg) by mouth 2 times daily    GERD (gastroesophageal reflux disease)       sildenafil 100 MG tablet    VIAGRA    12 tablet    Take 0.5-1 tablets ( mg) by mouth daily as needed 30 min-4 hrs before intercourse.Never use with nitroglycerin,terazosin or doxazosin.    Hypercholesteremia       simvastatin 20 MG tablet    ZOCOR    90 tablet    Take 1 tablet (20 mg) by mouth At Bedtime    Hypercholesteremia

## 2018-11-16 NOTE — NURSING NOTE
"Chief Complaint   Patient presents with     Forms     needs form filled out for his stenosis. Tingling feeling in his neck and jaw episodes occroing more frquently     initial /74  Pulse 68  Temp 98.3  F (36.8  C) (Oral)  Resp 16  Ht 5' 8.5\" (1.74 m)  Wt 176 lb (79.8 kg)  SpO2 96%  BMI 26.37 kg/m2 Estimated body mass index is 26.37 kg/(m^2) as calculated from the following:    Height as of this encounter: 5' 8.5\" (1.74 m).    Weight as of this encounter: 176 lb (79.8 kg)..  bp completed using cuff size large  DANNA SPENCER LPN  "

## 2018-11-16 NOTE — PROGRESS NOTES
.  SUBJECTIVE:   Anant Mena is a 53 year old male who presents to clinic today for the following health issues:      Back pain   Neck paresthesias     Patient has history of LS spine DDD, OA, chronic left leg radiculopathy. Symptoms are worse with prolonged sitting, driving in a car or flying in a plane. Has pain radiating to the left posterior buttock and leg. Able to ambulate, no pain with walking. No leg weakness or numbness.   Has developed left lateral neck numbness, initially at night , now feels it during the day. Neck feels stiff and mildly painful in the posterior area. No pain radiation to the arms. No injury.   Has h/o low testosterone , seen urology, on treatment, follow up with urology.       Problem list and histories reviewed & adjusted, as indicated.  Additional history: as documented    Patient Active Problem List   Diagnosis     Hypercholesteremia     GERD (gastroesophageal reflux disease)     Back pain     Insomnia     Low back pain     Lumbar radiculopathy     HYPERLIPIDEMIA LDL GOAL <130     Anxiety     Low testosterone     Past Surgical History:   Procedure Laterality Date     COLONOSCOPY  12/24/2013    Procedure: COLONOSCOPY;  COLONOSCOPY       ESOPHAGOSCOPY, GASTROSCOPY, DUODENOSCOPY (EGD), COMBINED  8/16/2013    Procedure: COMBINED ESOPHAGOSCOPY, GASTROSCOPY, DUODENOSCOPY (EGD);  ESOPHAGOSCOPY, GASTROSCOPY, DUODENOSCOPY ;  Surgeon: Randy Ohara MD;  Location:  GI     KNEE SURGERY  4/2015    Lt Meniscus Repair : Dr. Philip Slade       Social History   Substance Use Topics     Smoking status: Never Smoker     Smokeless tobacco: Never Used     Alcohol use Yes      Comment: ocas     Family History   Problem Relation Age of Onset     Osteoporosis Mother      born 1936 and HTN     Alzheimer Disease Father      born 1931and DM     Diabetes Brother      Breast Cancer Sister      BRACA 2 positive     Family History Negative Sister      Family History Negative Sister      Family History  Negative Sister      Family History Negative Daughter      Respiratory Daughter      asthma         Current Outpatient Prescriptions   Medication Sig Dispense Refill     aspirin 81 MG tablet Take by mouth daily       cetirizine (ZYRTEC) 10 MG tablet Take 1 tablet by mouth daily as needed. 30 tablet 5     cholecalciferol (VITAMIN D) 1000 UNIT tablet Take 1 tablet by mouth daily.       fluticasone (FLONASE) 50 MCG/ACT nasal spray Spray 1-2 sprays into both nostrils daily. 3 Package 3     ibuprofen 200 MG capsule Take 200 mg by mouth every 4 hours as needed for fever 120 capsule 3     ketoconazole (NIZORAL) 2 % shampoo   5     metroNIDAZOLE (METROGEL) 1 % gel Apply topically daily 60 g 11     Omega-3 Fatty Acids (FISH OIL PO) Take  by mouth.       ranitidine (ZANTAC) 150 MG tablet Take 1 tablet (150 mg) by mouth 2 times daily 60 tablet 3     sildenafil (VIAGRA) 100 MG tablet Take 0.5-1 tablets ( mg) by mouth daily as needed 30 min-4 hrs before intercourse.Never use with nitroglycerin,terazosin or doxazosin. 12 tablet 3     simvastatin (ZOCOR) 20 MG tablet Take 1 tablet (20 mg) by mouth At Bedtime 90 tablet 3     Tadalafil (CIALIS PO) Take 5 mg by mouth daily       testosterone (ANDROGEL 1% PUMP) 12.5 MG/ACT (1%) gel Place 3 pumps (37.5 mg) onto the skin daily Apply from dispenser to clean, dry, intact skin of the shoulders, upper arms, or abdomen. 150 g 5     cyclobenzaprine (FLEXERIL) 10 MG tablet Take 0.5-1 tablets (5-10 mg) by mouth nightly as needed for muscle spasms (Patient not taking: Reported on 11/16/2018) 30 tablet 3     omeprazole (PRILOSEC OTC) 20 MG tablet Take 1 tablet (20 mg) by mouth daily as needed (Patient not taking: Reported on 11/16/2018) 30 tablet 5       Reviewed and updated as needed this visit by clinical staff  Tobacco  Med Hx  Surg Hx  Fam Hx  Soc Hx      Reviewed and updated as needed this visit by Provider         ROS:  Constitutional, HEENT, cardiovascular, pulmonary, gi and gu  "systems are negative, except as otherwise noted.    OBJECTIVE:     /74  Pulse 68  Temp 98.3  F (36.8  C) (Oral)  Resp 16  Ht 5' 8.5\" (1.74 m)  Wt 176 lb (79.8 kg)  SpO2 96%  BMI 26.37 kg/m2  Body mass index is 26.37 kg/(m^2).   GENERAL: healthy, alert and no distress  NECK: no adenopathy, no asymmetry, masses, or scars and thyroid normal to palpation  RESP: lungs clear to auscultation - no rales, rhonchi or wheezes  CV: regular rate and rhythm, normal S1 S2, no S3 or S4, no murmur, click or rub, no peripheral edema and peripheral pulses strong  MS: no gross musculoskeletal defects noted, no edema  NEURO: Normal strength and tone, mentation intact and speech normal    Diagnostic Test Results:  none     ASSESSMENT/PLAN:     Problem List Items Addressed This Visit     Low testosterone      Other Visit Diagnoses     Cervical radiculopathy    -  Primary    Relevant Orders    MR Cervical Spine w/o Contrast    Lumbosacral radiculopathy               Form filled in for accomodation of driving seat, ergonomic seat to prevent pain related to DDD   Assess MRI cervical spine   Cont treatment     Follow-Up:with results     Elgin Patel MD  [facility]    "

## 2018-12-05 ENCOUNTER — HOSPITAL ENCOUNTER (OUTPATIENT)
Dept: MRI IMAGING | Facility: CLINIC | Age: 53
Discharge: HOME | End: 2018-12-05
Attending: INTERNAL MEDICINE | Admitting: INTERNAL MEDICINE
Payer: COMMERCIAL

## 2018-12-05 DIAGNOSIS — M54.12 CERVICAL RADICULOPATHY: ICD-10-CM

## 2018-12-05 PROCEDURE — 72141 MRI NECK SPINE W/O DYE: CPT

## 2018-12-06 DIAGNOSIS — M50.30 DDD (DEGENERATIVE DISC DISEASE), CERVICAL: Primary | ICD-10-CM

## 2019-08-01 DIAGNOSIS — E78.00 HYPERCHOLESTEREMIA: ICD-10-CM

## 2019-08-01 NOTE — TELEPHONE ENCOUNTER
"Requested Prescriptions   Pending Prescriptions Disp Refills     simvastatin (ZOCOR) 20 MG tablet Last Written Prescription Date:  6/22/2018  Last Fill Quantity: 90 tablet,  # refills: 3   Last office visit: 11/16/2018 with prescribing provider:  11/16/2018   Future Office Visit:   Next 5 appointments (look out 90 days)    Aug 23, 2019  9:40 AM CDT  PHYSICAL with Elgin Patel MD  [facility] [address]  [facility] [id number] [phone]        90 tablet 3     Sig: Take 1 tablet (20 mg) by mouth At Bedtime       Statins Protocol Failed - 8/1/2019  1:24 PM        Failed - LDL on file in past 12 months     Recent Labs   Lab Test 06/22/18  0825   *             Passed - No abnormal creatine kinase in past 12 months     No lab results found.             Passed - Recent (12 mo) or future (30 days) visit within the authorizing provider's specialty     Patient had office visit in the last 12 months or has a visit in the next 30 days with authorizing provider or within the authorizing provider's specialty.  See \"Patient Info\" tab in inbasket, or \"Choose Columns\" in Meds & Orders section of the refill encounter.              Passed - Medication is active on med list        Passed - Patient is age 18 or older        "

## 2019-08-02 RX ORDER — SIMVASTATIN 20 MG
20 TABLET ORAL AT BEDTIME
Qty: 90 TABLET | Refills: 3 | Status: SHIPPED | OUTPATIENT
Start: 2019-08-02 | End: 2019-08-23

## 2019-08-20 ENCOUNTER — TRANSFERRED RECORDS (OUTPATIENT)
Dept: HEALTH INFORMATION MANAGEMENT | Facility: CLINIC | Age: 54
End: 2019-08-20

## 2019-08-23 ENCOUNTER — OFFICE VISIT (OUTPATIENT)
Dept: INTERNAL MEDICINE | Facility: CLINIC | Age: 54
End: 2019-08-23
Payer: COMMERCIAL

## 2019-08-23 VITALS
RESPIRATION RATE: 16 BRPM | TEMPERATURE: 97.5 F | WEIGHT: 168 LBS | OXYGEN SATURATION: 97 % | BODY MASS INDEX: 25.46 KG/M2 | HEART RATE: 61 BPM | DIASTOLIC BLOOD PRESSURE: 70 MMHG | HEIGHT: 68 IN | SYSTOLIC BLOOD PRESSURE: 122 MMHG

## 2019-08-23 DIAGNOSIS — E78.5 HYPERLIPIDEMIA LDL GOAL <130: ICD-10-CM

## 2019-08-23 DIAGNOSIS — R79.89 LOW TESTOSTERONE: ICD-10-CM

## 2019-08-23 DIAGNOSIS — E78.00 HYPERCHOLESTEREMIA: ICD-10-CM

## 2019-08-23 DIAGNOSIS — Z00.00 ROUTINE GENERAL MEDICAL EXAMINATION AT A HEALTH CARE FACILITY: Primary | ICD-10-CM

## 2019-08-23 DIAGNOSIS — K21.9 GASTROESOPHAGEAL REFLUX DISEASE WITHOUT ESOPHAGITIS: ICD-10-CM

## 2019-08-23 DIAGNOSIS — Z12.5 SCREENING FOR PROSTATE CANCER: ICD-10-CM

## 2019-08-23 LAB
ALBUMIN SERPL-MCNC: 4.6 G/DL (ref 3.4–5)
ALBUMIN UR-MCNC: NEGATIVE MG/DL
ALP SERPL-CCNC: 65 U/L (ref 40–150)
ALT SERPL W P-5'-P-CCNC: 32 U/L (ref 0–70)
ANION GAP SERPL CALCULATED.3IONS-SCNC: 4 MMOL/L (ref 3–14)
APPEARANCE UR: CLEAR
AST SERPL W P-5'-P-CCNC: 23 U/L (ref 0–45)
BILIRUB SERPL-MCNC: 0.7 MG/DL (ref 0.2–1.3)
BILIRUB UR QL STRIP: NEGATIVE
BUN SERPL-MCNC: 21 MG/DL (ref 7–30)
CALCIUM SERPL-MCNC: 9.1 MG/DL (ref 8.5–10.1)
CHLORIDE SERPL-SCNC: 106 MMOL/L (ref 94–109)
CHOLEST SERPL-MCNC: 211 MG/DL
CO2 SERPL-SCNC: 29 MMOL/L (ref 20–32)
COLOR UR AUTO: YELLOW
CREAT SERPL-MCNC: 1.09 MG/DL (ref 0.66–1.25)
ERYTHROCYTE [DISTWIDTH] IN BLOOD BY AUTOMATED COUNT: 13.3 % (ref 10–15)
ESTRADIOL SERPL-MCNC: 18 PG/ML (ref 6–50)
GFR SERPL CREATININE-BSD FRML MDRD: 75 ML/MIN/{1.73_M2}
GLUCOSE SERPL-MCNC: 96 MG/DL (ref 70–99)
GLUCOSE UR STRIP-MCNC: NEGATIVE MG/DL
HCT VFR BLD AUTO: 46.7 % (ref 40–53)
HDLC SERPL-MCNC: 54 MG/DL
HGB BLD-MCNC: 15.5 G/DL (ref 13.3–17.7)
HGB UR QL STRIP: NEGATIVE
KETONES UR STRIP-MCNC: ABNORMAL MG/DL
LDLC SERPL CALC-MCNC: 133 MG/DL
LEUKOCYTE ESTERASE UR QL STRIP: NEGATIVE
MCH RBC QN AUTO: 30.5 PG (ref 26.5–33)
MCHC RBC AUTO-ENTMCNC: 33.2 G/DL (ref 31.5–36.5)
MCV RBC AUTO: 92 FL (ref 78–100)
NITRATE UR QL: NEGATIVE
NONHDLC SERPL-MCNC: 157 MG/DL
PH UR STRIP: 5.5 PH (ref 5–7)
PLATELET # BLD AUTO: 178 10E9/L (ref 150–450)
POTASSIUM SERPL-SCNC: 4.2 MMOL/L (ref 3.4–5.3)
PROT SERPL-MCNC: 7.9 G/DL (ref 6.8–8.8)
PSA SERPL-ACNC: 1.04 UG/L (ref 0–4)
RBC # BLD AUTO: 5.08 10E12/L (ref 4.4–5.9)
SODIUM SERPL-SCNC: 139 MMOL/L (ref 133–144)
SOURCE: ABNORMAL
SP GR UR STRIP: 1.02 (ref 1–1.03)
TRIGL SERPL-MCNC: 120 MG/DL
TSH SERPL DL<=0.005 MIU/L-ACNC: 1.81 MU/L (ref 0.4–4)
UROBILINOGEN UR STRIP-ACNC: 0.2 EU/DL (ref 0.2–1)
WBC # BLD AUTO: 6.2 10E9/L (ref 4–11)

## 2019-08-23 PROCEDURE — 90750 HZV VACC RECOMBINANT IM: CPT | Performed by: INTERNAL MEDICINE

## 2019-08-23 PROCEDURE — 36415 COLL VENOUS BLD VENIPUNCTURE: CPT | Performed by: INTERNAL MEDICINE

## 2019-08-23 PROCEDURE — 90471 IMMUNIZATION ADMIN: CPT | Performed by: INTERNAL MEDICINE

## 2019-08-23 PROCEDURE — 84403 ASSAY OF TOTAL TESTOSTERONE: CPT | Performed by: INTERNAL MEDICINE

## 2019-08-23 PROCEDURE — G0103 PSA SCREENING: HCPCS | Performed by: INTERNAL MEDICINE

## 2019-08-23 PROCEDURE — 99396 PREV VISIT EST AGE 40-64: CPT | Mod: 25 | Performed by: INTERNAL MEDICINE

## 2019-08-23 PROCEDURE — 82670 ASSAY OF TOTAL ESTRADIOL: CPT | Performed by: INTERNAL MEDICINE

## 2019-08-23 PROCEDURE — 99213 OFFICE O/P EST LOW 20 MIN: CPT | Mod: 25 | Performed by: INTERNAL MEDICINE

## 2019-08-23 PROCEDURE — 84443 ASSAY THYROID STIM HORMONE: CPT | Performed by: INTERNAL MEDICINE

## 2019-08-23 PROCEDURE — 85027 COMPLETE CBC AUTOMATED: CPT | Performed by: INTERNAL MEDICINE

## 2019-08-23 PROCEDURE — 80053 COMPREHEN METABOLIC PANEL: CPT | Performed by: INTERNAL MEDICINE

## 2019-08-23 PROCEDURE — 84270 ASSAY OF SEX HORMONE GLOBUL: CPT | Performed by: INTERNAL MEDICINE

## 2019-08-23 PROCEDURE — 81003 URINALYSIS AUTO W/O SCOPE: CPT | Performed by: INTERNAL MEDICINE

## 2019-08-23 PROCEDURE — 80061 LIPID PANEL: CPT | Performed by: INTERNAL MEDICINE

## 2019-08-23 RX ORDER — SILDENAFIL 100 MG/1
50-100 TABLET, FILM COATED ORAL DAILY PRN
Qty: 12 TABLET | Refills: 3 | Status: SHIPPED | OUTPATIENT
Start: 2019-08-23 | End: 2020-10-28

## 2019-08-23 RX ORDER — SIMVASTATIN 20 MG
20 TABLET ORAL AT BEDTIME
Qty: 90 TABLET | Refills: 3 | Status: SHIPPED | OUTPATIENT
Start: 2019-08-23 | End: 2020-07-10

## 2019-08-23 ASSESSMENT — ENCOUNTER SYMPTOMS
DIZZINESS: 0
DIARRHEA: 0
CONSTIPATION: 0
NERVOUS/ANXIOUS: 0
PARESTHESIAS: 0
EYE PAIN: 0
DYSURIA: 0
HEADACHES: 0
HEMATOCHEZIA: 0
HEARTBURN: 0
JOINT SWELLING: 0
ARTHRALGIAS: 0
FEVER: 0
MYALGIAS: 0
COUGH: 0
ABDOMINAL PAIN: 0
HEMATURIA: 0
FREQUENCY: 0
NAUSEA: 0
CHILLS: 0
PALPITATIONS: 0

## 2019-08-23 ASSESSMENT — MIFFLIN-ST. JEOR: SCORE: 1567.57

## 2019-08-23 NOTE — PROGRESS NOTES
SUBJECTIVE:   CC: Anant Mena is an 53 year old male who presents for preventative health visit.     Healthy Habits:     Getting at least 3 servings of Calcium per day:  NO    Bi-annual eye exam:  Yes    Dental care twice a year:  Yes    Sleep apnea or symptoms of sleep apnea:  Sleep apnea    Diet:  Carbohydrate counting    Frequency of exercise:  2-3 days/week    Duration of exercise:  15-30 minutes    Taking medications regularly:  Yes    Medication side effects:  None    PHQ-2 Total Score: 0    Additional concerns today:  No          PROBLEMS TO ADD ON...    Has H/O hyperlipidemia. On medical treatment and diet. No side effects. No muscle weakness, myalgias or upset stomach.     Has h/o GERD on Ranitidine and Omeprazole treatment. symptoms are controlled. No nausea, vomiting, heartburns, bloating.    Has h/o low testosterone. On topical testosterone treatment, seeing urology.     Today's PHQ-2 Score:   PHQ-2 ( 1999 Pfizer) 8/23/2019   Q1: Little interest or pleasure in doing things 0   Q2: Feeling down, depressed or hopeless 0   PHQ-2 Score 0   Q1: Little interest or pleasure in doing things Not at all   Q2: Feeling down, depressed or hopeless Not at all   PHQ-2 Score 0       Abuse: Current or Past(Physical, Sexual or Emotional)- No  Do you feel safe in your environment? Yes    Social History     Tobacco Use     Smoking status: Never Smoker     Smokeless tobacco: Never Used   Substance Use Topics     Alcohol use: Yes     Comment: ocas         Alcohol Use 8/23/2019   Prescreen: >3 drinks/day or >7 drinks/week? No   Prescreen: >3 drinks/day or >7 drinks/week? -       Last PSA:   PSA   Date Value Ref Range Status   06/22/2018 0.91 0 - 4 ug/L Final     Comment:     Assay Method:  Chemiluminescence using Siemens Vista analyzer       Reviewed orders with patient. Reviewed health maintenance and updated orders accordingly -   Lab work is in process  Labs reviewed in EPIC    Reviewed and updated as needed this visit  "by clinical staff  Tobacco  Allergies  Meds  Med Hx  Surg Hx  Fam Hx  Soc Hx        Reviewed and updated as needed this visit by Provider            Review of Systems   Constitutional: Negative for chills and fever.   HENT: Negative for congestion, ear pain and hearing loss.    Eyes: Negative for pain.   Respiratory: Negative for cough.    Cardiovascular: Negative for chest pain, palpitations and peripheral edema.   Gastrointestinal: Negative for abdominal pain, constipation, diarrhea, heartburn, hematochezia and nausea.   Genitourinary: Negative for dysuria, frequency, genital sores and hematuria.   Musculoskeletal: Negative for arthralgias, joint swelling and myalgias.   Neurological: Negative for dizziness, headaches and paresthesias.   Psychiatric/Behavioral: Negative for mood changes. The patient is not nervous/anxious.          OBJECTIVE:   /70   Pulse 61   Temp 97.5  F (36.4  C) (Oral)   Resp 16   Ht 1.721 m (5' 7.75\")   Wt 76.2 kg (168 lb)   SpO2 97%   BMI 25.73 kg/m      Physical Exam  GENERAL: healthy, alert and no distress  EYES: Eyes grossly normal to inspection, PERRL and conjunctivae and sclerae normal  HENT: ear canals and TM's normal, nose and mouth without ulcers or lesions  NECK: no adenopathy, no asymmetry, masses, or scars and thyroid normal to palpation  RESP: lungs clear to auscultation - no rales, rhonchi or wheezes  CV: regular rate and rhythm, normal S1 S2, no S3 or S4, no murmur, click or rub, no peripheral edema and peripheral pulses strong  ABDOMEN: soft, nontender, no hepatosplenomegaly, no masses and bowel sounds normal  MS: no gross musculoskeletal defects noted, no edema  SKIN: no suspicious lesions or rashes  NEURO: Normal strength and tone, mentation intact and speech normal  PSYCH: mentation appears normal, affect normal/bright    Diagnostic Test Results:  Labs reviewed in Epic    ASSESSMENT/PLAN:       ICD-10-CM    1. Routine general medical examination at a " "health care facility Z00.00 CBC with platelets     Comprehensive metabolic panel     Lipid panel reflex to direct LDL Fasting     TSH with free T4 reflex     Prostate spec antigen screen     *UA reflex to Microscopic     Testosterone Free and Total     Estradiol   2. Hypercholesteremia E78.00 sildenafil (VIAGRA) 100 MG tablet     simvastatin (ZOCOR) 20 MG tablet     Comprehensive metabolic panel     Lipid panel reflex to direct LDL Fasting   3. Hyperlipidemia LDL goal <130 E78.5 OFFICE/OUTPT VISIT,EST,LEVL III   4. Low testosterone R79.89 *UA reflex to Microscopic     Testosterone Free and Total     Estradiol     OFFICE/OUTPT VISIT,EST,LEVL III   5. Screening for prostate cancer Z12.5 Prostate spec antigen screen   6. Gastroesophageal reflux disease without esophagitis K21.9 OFFICE/OUTPT VISIT,EST,LEVL III     Assess lab work  GERD controlled   Continue treatment   Assess lipids, LFT , on statin       COUNSELING:   Reviewed preventive health counseling, as reflected in patient instructions       Regular exercise       Healthy diet/nutrition       Vision screening       Hearing screening       Colon cancer screening       Prostate cancer screening    Estimated body mass index is 25.73 kg/m  as calculated from the following:    Height as of this encounter: 1.721 m (5' 7.75\").    Weight as of this encounter: 76.2 kg (168 lb).          reports that he has never smoked. He has never used smokeless tobacco.      Counseling Resources:  ATP IV Guidelines  Pooled Cohorts Equation Calculator  FRAX Risk Assessment  ICSI Preventive Guidelines  Dietary Guidelines for Americans, 2010  USDA's MyPlate  ASA Prophylaxis  Lung CA Screening    Elgin Patel MD  [facility]  "

## 2019-08-23 NOTE — LETTER
[facility]  [address], [address]  [place] [address]  [phone]        August 28, 2019    Anant Mena  [address] [facility] [address]            Dear Mr. Anant Mena:      The results of your recent labs were NORMAL.      If you have any further questions or problems, please contact our office.      Sincerely,        Elgin Patel M.D.

## 2019-08-27 LAB
SHBG SERPL-SCNC: 36 NMOL/L (ref 11–80)
TESTOST FREE SERPL-MCNC: 6.4 NG/DL (ref 4.7–24.4)
TESTOST SERPL-MCNC: 339 NG/DL (ref 240–950)

## 2020-07-09 DIAGNOSIS — E78.00 HYPERCHOLESTEREMIA: ICD-10-CM

## 2020-07-10 RX ORDER — SIMVASTATIN 20 MG
TABLET ORAL
Qty: 90 TABLET | Refills: 0 | Status: SHIPPED | OUTPATIENT
Start: 2020-07-10 | End: 2020-08-13

## 2020-07-10 NOTE — TELEPHONE ENCOUNTER
Pending Prescriptions:                       Disp   Refills    simvastatin (ZOCOR) 20 MG tablet [Pharmacy*90 tab*0        Sig: TAKE 1 TABLET AT BEDTIME    Routing refill request to provider for review/approval because:  Drug interaction warning    Next 5 appointments (look out 90 days)    Aug 28, 2020  8:40 AM CDT  PHYSICAL with Elgin Patel MD  [facility] [address] [facility] [id number] [phone]

## 2020-08-07 ENCOUNTER — TRANSFERRED RECORDS (OUTPATIENT)
Dept: HEALTH INFORMATION MANAGEMENT | Facility: CLINIC | Age: 55
End: 2020-08-07

## 2020-08-13 DIAGNOSIS — E78.00 HYPERCHOLESTEREMIA: ICD-10-CM

## 2020-08-13 RX ORDER — SIMVASTATIN 20 MG
TABLET ORAL
Qty: 90 TABLET | Refills: 0 | Status: SHIPPED | OUTPATIENT
Start: 2020-08-13 | End: 2020-11-10

## 2020-10-28 ENCOUNTER — OFFICE VISIT (OUTPATIENT)
Dept: INTERNAL MEDICINE | Facility: CLINIC | Age: 55
End: 2020-10-28
Payer: COMMERCIAL

## 2020-10-28 VITALS
TEMPERATURE: 97.9 F | BODY MASS INDEX: 25.31 KG/M2 | DIASTOLIC BLOOD PRESSURE: 82 MMHG | WEIGHT: 167 LBS | HEART RATE: 59 BPM | HEIGHT: 68 IN | SYSTOLIC BLOOD PRESSURE: 125 MMHG | OXYGEN SATURATION: 100 %

## 2020-10-28 DIAGNOSIS — Z12.5 SCREENING FOR PROSTATE CANCER: ICD-10-CM

## 2020-10-28 DIAGNOSIS — Z23 NEED FOR SHINGLES VACCINE: ICD-10-CM

## 2020-10-28 DIAGNOSIS — K21.9 GASTROESOPHAGEAL REFLUX DISEASE WITHOUT ESOPHAGITIS: ICD-10-CM

## 2020-10-28 DIAGNOSIS — E78.00 HYPERCHOLESTEREMIA: ICD-10-CM

## 2020-10-28 DIAGNOSIS — R79.89 LOW TESTOSTERONE: ICD-10-CM

## 2020-10-28 DIAGNOSIS — Z23 NEED FOR PROPHYLACTIC VACCINATION AND INOCULATION AGAINST INFLUENZA: ICD-10-CM

## 2020-10-28 DIAGNOSIS — E78.5 HYPERLIPIDEMIA LDL GOAL <130: ICD-10-CM

## 2020-10-28 DIAGNOSIS — Z00.00 ENCOUNTER FOR PREVENTATIVE ADULT HEALTH CARE EXAMINATION: Primary | ICD-10-CM

## 2020-10-28 LAB
ALBUMIN UR-MCNC: NEGATIVE MG/DL
APPEARANCE UR: CLEAR
BILIRUB UR QL STRIP: NEGATIVE
COLOR UR AUTO: YELLOW
ERYTHROCYTE [DISTWIDTH] IN BLOOD BY AUTOMATED COUNT: 12.4 % (ref 10–15)
GLUCOSE UR STRIP-MCNC: NEGATIVE MG/DL
HCT VFR BLD AUTO: 46.7 % (ref 40–53)
HGB BLD-MCNC: 15.1 G/DL (ref 13.3–17.7)
HGB UR QL STRIP: NEGATIVE
KETONES UR STRIP-MCNC: NEGATIVE MG/DL
LEUKOCYTE ESTERASE UR QL STRIP: NEGATIVE
MCH RBC QN AUTO: 30.7 PG (ref 26.5–33)
MCHC RBC AUTO-ENTMCNC: 32.3 G/DL (ref 31.5–36.5)
MCV RBC AUTO: 95 FL (ref 78–100)
NITRATE UR QL: NEGATIVE
PH UR STRIP: 6 PH (ref 5–7)
PLATELET # BLD AUTO: 184 10E9/L (ref 150–450)
RBC # BLD AUTO: 4.92 10E12/L (ref 4.4–5.9)
SOURCE: NORMAL
SP GR UR STRIP: 1.02 (ref 1–1.03)
UROBILINOGEN UR STRIP-ACNC: 0.2 EU/DL (ref 0.2–1)
WBC # BLD AUTO: 4.9 10E9/L (ref 4–11)

## 2020-10-28 PROCEDURE — 80053 COMPREHEN METABOLIC PANEL: CPT | Performed by: INTERNAL MEDICINE

## 2020-10-28 PROCEDURE — 84270 ASSAY OF SEX HORMONE GLOBUL: CPT | Performed by: INTERNAL MEDICINE

## 2020-10-28 PROCEDURE — 99396 PREV VISIT EST AGE 40-64: CPT | Mod: 25 | Performed by: INTERNAL MEDICINE

## 2020-10-28 PROCEDURE — G0103 PSA SCREENING: HCPCS | Performed by: INTERNAL MEDICINE

## 2020-10-28 PROCEDURE — 36415 COLL VENOUS BLD VENIPUNCTURE: CPT | Performed by: INTERNAL MEDICINE

## 2020-10-28 PROCEDURE — 90472 IMMUNIZATION ADMIN EACH ADD: CPT | Performed by: INTERNAL MEDICINE

## 2020-10-28 PROCEDURE — 80061 LIPID PANEL: CPT | Performed by: INTERNAL MEDICINE

## 2020-10-28 PROCEDURE — 99000 SPECIMEN HANDLING OFFICE-LAB: CPT | Performed by: INTERNAL MEDICINE

## 2020-10-28 PROCEDURE — 85027 COMPLETE CBC AUTOMATED: CPT | Performed by: INTERNAL MEDICINE

## 2020-10-28 PROCEDURE — 90750 HZV VACC RECOMBINANT IM: CPT | Performed by: INTERNAL MEDICINE

## 2020-10-28 PROCEDURE — 84443 ASSAY THYROID STIM HORMONE: CPT | Performed by: INTERNAL MEDICINE

## 2020-10-28 PROCEDURE — 90682 RIV4 VACC RECOMBINANT DNA IM: CPT | Performed by: INTERNAL MEDICINE

## 2020-10-28 PROCEDURE — 90471 IMMUNIZATION ADMIN: CPT | Performed by: INTERNAL MEDICINE

## 2020-10-28 PROCEDURE — 81003 URINALYSIS AUTO W/O SCOPE: CPT | Performed by: INTERNAL MEDICINE

## 2020-10-28 PROCEDURE — 84403 ASSAY OF TOTAL TESTOSTERONE: CPT | Mod: 90 | Performed by: INTERNAL MEDICINE

## 2020-10-28 RX ORDER — SILDENAFIL 100 MG/1
50-100 TABLET, FILM COATED ORAL DAILY PRN
Qty: 12 TABLET | Refills: 3 | Status: SHIPPED | OUTPATIENT
Start: 2020-10-28 | End: 2021-12-03

## 2020-10-28 ASSESSMENT — ENCOUNTER SYMPTOMS
FEVER: 0
SHORTNESS OF BREATH: 0
HEMATOCHEZIA: 0
HEARTBURN: 1
NERVOUS/ANXIOUS: 0
DYSURIA: 0
PALPITATIONS: 0
EYE PAIN: 0
ABDOMINAL PAIN: 0
HEADACHES: 0
COUGH: 0
CONSTIPATION: 0
ARTHRALGIAS: 1
FREQUENCY: 1
NAUSEA: 0
JOINT SWELLING: 0
SORE THROAT: 0
DIARRHEA: 0
MYALGIAS: 0
CHILLS: 0
WEAKNESS: 0
DIZZINESS: 0
PARESTHESIAS: 0
HEMATURIA: 0

## 2020-10-28 ASSESSMENT — MIFFLIN-ST. JEOR: SCORE: 1553.04

## 2020-10-28 NOTE — PROGRESS NOTES
SUBJECTIVE:   CC: Anant Mena is an 55 year old male who presents for preventative health visit.       Patient has been advised of split billing requirements and indicates understanding: Yes  Healthy Habits:     Getting at least 3 servings of Calcium per day:  NO    Bi-annual eye exam:  Yes    Dental care twice a year:  Yes    Sleep apnea or symptoms of sleep apnea:  Daytime drowsiness    Diet:  Regular (no restrictions)    Frequency of exercise:  2-3 days/week    Duration of exercise:  15-30 minutes    Taking medications regularly:  Yes    Medication side effects:  None    PHQ-2 Total Score: 0    Additional concerns today:  No          PROBLEMS TO ADD ON...  No acute complaints, no medication change or new medical conditions.  Seeing urology for history of low testosterone. On topical treatment, uses 3 pumps daily.   Has H/O hyperlipidemia. On medical treatment with statin and diet. No side effects. No muscle weakness, myalgias or upset stomach.   Has h/o GERD on Prilosec treatment. symptoms are controlled. No nausea, vomiting, occasional heartburns, bloating.  Has knee pain, related to OA, seeing ortho for injections.     Today's PHQ-2 Score:   PHQ-2 ( 1999 Pfizer) 10/28/2020   Q1: Little interest or pleasure in doing things 0   Q2: Feeling down, depressed or hopeless 0   PHQ-2 Score 0   Q1: Little interest or pleasure in doing things Not at all   Q2: Feeling down, depressed or hopeless Not at all   PHQ-2 Score 0       Abuse: Current or Past(Physical, Sexual or Emotional)- No  Do you feel safe in your environment? Yes        Social History     Tobacco Use     Smoking status: Never Smoker     Smokeless tobacco: Never Used   Substance Use Topics     Alcohol use: Yes     Comment: ocas     If you drink alcohol do you typically have >3 drinks per day or >7 drinks per week? No    Alcohol Use 10/28/2020   Prescreen: >3 drinks/day or >7 drinks/week? No   Prescreen: >3 drinks/day or >7 drinks/week? -       Last PSA:  "  PSA   Date Value Ref Range Status   08/23/2019 1.04 0 - 4 ug/L Final     Comment:     Assay Method:  Chemiluminescence using Siemens Vista analyzer       Reviewed orders with patient. Reviewed health maintenance and updated orders accordingly - Yes  Lab work is in process  Labs reviewed in EPIC    Reviewed and updated as needed this visit by clinical staff  Tobacco  Allergies  Meds   Med Hx  Surg Hx  Fam Hx  Soc Hx        Reviewed and updated as needed this visit by Provider                    Review of Systems   Constitutional: Negative for chills and fever.   HENT: Negative for congestion, ear pain, hearing loss and sore throat.    Eyes: Negative for pain and visual disturbance.   Respiratory: Negative for cough and shortness of breath.    Cardiovascular: Negative for chest pain, palpitations and peripheral edema.   Gastrointestinal: Positive for heartburn. Negative for abdominal pain, constipation, diarrhea, hematochezia and nausea.   Genitourinary: Positive for frequency and impotence. Negative for discharge, dysuria, genital sores, hematuria and urgency.   Musculoskeletal: Positive for arthralgias. Negative for joint swelling and myalgias.   Skin: Negative for rash.   Neurological: Negative for dizziness, weakness, headaches and paresthesias.   Psychiatric/Behavioral: Negative for mood changes. The patient is not nervous/anxious.          OBJECTIVE:   /82 (BP Location: Left arm, Patient Position: Sitting, Cuff Size: Adult Regular)   Pulse 59   Temp 97.9  F (36.6  C) (Oral)   Ht 1.721 m (5' 7.75\")   Wt 75.8 kg (167 lb)   SpO2 100%   BMI 25.58 kg/m      Physical Exam  GENERAL: healthy, alert and no distress  EYES: Eyes grossly normal to inspection, PERRL and conjunctivae and sclerae normal  HENT: ear canals and TM's normal, nose and mouth without ulcers or lesions  NECK: no adenopathy, no asymmetry, masses, or scars and thyroid normal to palpation  RESP: lungs clear to auscultation - no rales, " "rhonchi or wheezes  CV: regular rate and rhythm, normal S1 S2, no S3 or S4, no murmur, click or rub, no peripheral edema and peripheral pulses strong  ABDOMEN: soft, nontender, no hepatosplenomegaly, no masses and bowel sounds normal  MS: no gross musculoskeletal defects noted, no edema  SKIN: no suspicious lesions or rashes  NEURO: Normal strength and tone, mentation intact and speech normal  PSYCH: mentation appears normal, affect normal/bright    Diagnostic Test Results:  Labs reviewed in Epic    ASSESSMENT/PLAN:       ICD-10-CM    1. Encounter for preventative adult health care examination  Z00.00 CBC with platelets     Comprehensive metabolic panel     Lipid panel reflex to direct LDL Fasting     TSH with free T4 reflex     Prostate spec antigen screen     *UA reflex to Microscopic     Testosterone Free and Total   2. Hypercholesteremia  E78.00 sildenafil (VIAGRA) 100 MG tablet     CBC with platelets     Comprehensive metabolic panel     Lipid panel reflex to direct LDL Fasting     TSH with free T4 reflex   3. Low testosterone  R79.89 Testosterone Free and Total   4. Screening for prostate cancer  Z12.5 Prostate spec antigen screen   5. Hyperlipidemia LDL goal <130  E78.5    6. Gastroesophageal reflux disease without esophagitis  K21.9        Patient has been advised of split billing requirements and indicates understanding: Yes  COUNSELING:   Reviewed preventive health counseling, as reflected in patient instructions       Regular exercise       Healthy diet/nutrition       Vision screening       Hearing screening       Colon cancer screening       Prostate cancer screening    Estimated body mass index is 25.58 kg/m  as calculated from the following:    Height as of this encounter: 1.721 m (5' 7.75\").    Weight as of this encounter: 75.8 kg (167 lb).         He reports that he has never smoked. He has never used smokeless tobacco.      Counseling Resources:  ATP IV Guidelines  Pooled Cohorts Equation " Calculator  FRAX Risk Assessment  ICSI Preventive Guidelines  Dietary Guidelines for Americans, 2010  USDA's MyPlate  ASA Prophylaxis  Lung CA Screening    Elgin Patel MD  [facility]

## 2020-10-29 ENCOUNTER — TRANSFERRED RECORDS (OUTPATIENT)
Dept: HEALTH INFORMATION MANAGEMENT | Facility: CLINIC | Age: 55
End: 2020-10-29

## 2020-10-29 LAB
ALBUMIN SERPL-MCNC: 4.2 G/DL (ref 3.4–5)
ALP SERPL-CCNC: 61 U/L (ref 40–150)
ALT SERPL W P-5'-P-CCNC: 26 U/L (ref 0–70)
ANION GAP SERPL CALCULATED.3IONS-SCNC: 5 MMOL/L (ref 3–14)
AST SERPL W P-5'-P-CCNC: 26 U/L (ref 0–45)
BILIRUB SERPL-MCNC: 0.6 MG/DL (ref 0.2–1.3)
BUN SERPL-MCNC: 20 MG/DL (ref 7–30)
CALCIUM SERPL-MCNC: 9.2 MG/DL (ref 8.5–10.1)
CHLORIDE SERPL-SCNC: 106 MMOL/L (ref 94–109)
CHOLEST SERPL-MCNC: 167 MG/DL
CO2 SERPL-SCNC: 29 MMOL/L (ref 20–32)
CREAT SERPL-MCNC: 1.12 MG/DL (ref 0.66–1.25)
GFR SERPL CREATININE-BSD FRML MDRD: 72 ML/MIN/{1.73_M2}
GLUCOSE SERPL-MCNC: 90 MG/DL (ref 70–99)
HDLC SERPL-MCNC: 50 MG/DL
LDLC SERPL CALC-MCNC: 99 MG/DL
NONHDLC SERPL-MCNC: 117 MG/DL
POTASSIUM SERPL-SCNC: 4.3 MMOL/L (ref 3.4–5.3)
PROT SERPL-MCNC: 7.1 G/DL (ref 6.8–8.8)
PSA SERPL-ACNC: 1 UG/L (ref 0–4)
SODIUM SERPL-SCNC: 140 MMOL/L (ref 133–144)
TRIGL SERPL-MCNC: 89 MG/DL
TSH SERPL DL<=0.005 MIU/L-ACNC: 2.46 MU/L (ref 0.4–4)

## 2020-10-30 LAB
SHBG SERPL-SCNC: 23 NMOL/L (ref 11–80)
TESTOST FREE SERPL-MCNC: 9.74 NG/DL (ref 4.7–24.4)
TESTOST SERPL-MCNC: 396 NG/DL (ref 240–950)

## 2020-11-06 DIAGNOSIS — E78.00 HYPERCHOLESTEREMIA: ICD-10-CM

## 2020-11-10 RX ORDER — SIMVASTATIN 20 MG
TABLET ORAL
Qty: 90 TABLET | Refills: 3 | Status: SHIPPED | OUTPATIENT
Start: 2020-11-10 | End: 2021-09-28

## 2021-04-12 ENCOUNTER — IMMUNIZATION (OUTPATIENT)
Dept: NURSING | Facility: CLINIC | Age: 56
End: 2021-04-12
Payer: COMMERCIAL

## 2021-04-12 PROCEDURE — 91300 PR COVID VAC PFIZER DIL RECON 30 MCG/0.3 ML IM: CPT

## 2021-04-12 PROCEDURE — 0001A PR COVID VAC PFIZER DIL RECON 30 MCG/0.3 ML IM: CPT

## 2021-05-03 ENCOUNTER — IMMUNIZATION (OUTPATIENT)
Dept: NURSING | Facility: CLINIC | Age: 56
End: 2021-05-03
Attending: INTERNAL MEDICINE
Payer: COMMERCIAL

## 2021-05-03 PROCEDURE — 91300 PR COVID VAC PFIZER DIL RECON 30 MCG/0.3 ML IM: CPT

## 2021-05-03 PROCEDURE — 0002A PR COVID VAC PFIZER DIL RECON 30 MCG/0.3 ML IM: CPT

## 2021-10-17 ENCOUNTER — TRANSFERRED RECORDS (OUTPATIENT)
Dept: HEALTH INFORMATION MANAGEMENT | Facility: CLINIC | Age: 56
End: 2021-10-17
Payer: COMMERCIAL

## 2021-10-19 ENCOUNTER — TRANSFERRED RECORDS (OUTPATIENT)
Dept: HEALTH INFORMATION MANAGEMENT | Facility: CLINIC | Age: 56
End: 2021-10-19

## 2021-10-24 ENCOUNTER — HEALTH MAINTENANCE LETTER (OUTPATIENT)
Age: 56
End: 2021-10-24

## 2021-12-03 ENCOUNTER — OFFICE VISIT (OUTPATIENT)
Dept: INTERNAL MEDICINE | Facility: CLINIC | Age: 56
End: 2021-12-03
Payer: COMMERCIAL

## 2021-12-03 VITALS
OXYGEN SATURATION: 99 % | HEART RATE: 75 BPM | TEMPERATURE: 97.9 F | RESPIRATION RATE: 16 BRPM | SYSTOLIC BLOOD PRESSURE: 102 MMHG | WEIGHT: 162 LBS | HEIGHT: 68 IN | BODY MASS INDEX: 24.55 KG/M2 | DIASTOLIC BLOOD PRESSURE: 70 MMHG

## 2021-12-03 DIAGNOSIS — N40.1 BENIGN PROSTATIC HYPERPLASIA WITH URINARY HESITANCY: ICD-10-CM

## 2021-12-03 DIAGNOSIS — Z23 NEED FOR PROPHYLACTIC VACCINATION AND INOCULATION AGAINST INFLUENZA: ICD-10-CM

## 2021-12-03 DIAGNOSIS — R39.11 BENIGN PROSTATIC HYPERPLASIA WITH URINARY HESITANCY: ICD-10-CM

## 2021-12-03 DIAGNOSIS — N52.9 ERECTILE DYSFUNCTION, UNSPECIFIED ERECTILE DYSFUNCTION TYPE: ICD-10-CM

## 2021-12-03 DIAGNOSIS — Z12.5 SCREENING FOR PROSTATE CANCER: ICD-10-CM

## 2021-12-03 DIAGNOSIS — R79.89 LOW TESTOSTERONE: ICD-10-CM

## 2021-12-03 DIAGNOSIS — E78.00 HYPERCHOLESTEREMIA: ICD-10-CM

## 2021-12-03 DIAGNOSIS — Z00.00 ENCOUNTER FOR PREVENTATIVE ADULT HEALTH CARE EXAMINATION: Primary | ICD-10-CM

## 2021-12-03 LAB
ALBUMIN UR-MCNC: NEGATIVE MG/DL
APPEARANCE UR: CLEAR
BACTERIA #/AREA URNS HPF: NORMAL /HPF
BILIRUB UR QL STRIP: NEGATIVE
COLOR UR AUTO: YELLOW
ERYTHROCYTE [DISTWIDTH] IN BLOOD BY AUTOMATED COUNT: 13.2 % (ref 10–15)
GLUCOSE UR STRIP-MCNC: NEGATIVE MG/DL
HCT VFR BLD AUTO: 45.2 % (ref 40–53)
HGB BLD-MCNC: 15 G/DL (ref 13.3–17.7)
HGB UR QL STRIP: ABNORMAL
KETONES UR STRIP-MCNC: NEGATIVE MG/DL
LEUKOCYTE ESTERASE UR QL STRIP: NEGATIVE
MCH RBC QN AUTO: 30.9 PG (ref 26.5–33)
MCHC RBC AUTO-ENTMCNC: 33.2 G/DL (ref 31.5–36.5)
MCV RBC AUTO: 93 FL (ref 78–100)
NITRATE UR QL: NEGATIVE
PH UR STRIP: 5.5 [PH] (ref 5–7)
PLATELET # BLD AUTO: 169 10E3/UL (ref 150–450)
RBC # BLD AUTO: 4.86 10E6/UL (ref 4.4–5.9)
RBC #/AREA URNS AUTO: NORMAL /HPF
SP GR UR STRIP: >=1.03 (ref 1–1.03)
UROBILINOGEN UR STRIP-ACNC: 0.2 E.U./DL
WBC # BLD AUTO: 4.7 10E3/UL (ref 4–11)
WBC #/AREA URNS AUTO: NORMAL /HPF

## 2021-12-03 PROCEDURE — 84270 ASSAY OF SEX HORMONE GLOBUL: CPT | Performed by: INTERNAL MEDICINE

## 2021-12-03 PROCEDURE — 81001 URINALYSIS AUTO W/SCOPE: CPT | Performed by: INTERNAL MEDICINE

## 2021-12-03 PROCEDURE — 99396 PREV VISIT EST AGE 40-64: CPT | Mod: 25 | Performed by: INTERNAL MEDICINE

## 2021-12-03 PROCEDURE — 85027 COMPLETE CBC AUTOMATED: CPT | Performed by: INTERNAL MEDICINE

## 2021-12-03 PROCEDURE — 90682 RIV4 VACC RECOMBINANT DNA IM: CPT | Performed by: INTERNAL MEDICINE

## 2021-12-03 PROCEDURE — 99213 OFFICE O/P EST LOW 20 MIN: CPT | Mod: 25 | Performed by: INTERNAL MEDICINE

## 2021-12-03 PROCEDURE — 36415 COLL VENOUS BLD VENIPUNCTURE: CPT | Performed by: INTERNAL MEDICINE

## 2021-12-03 PROCEDURE — G0103 PSA SCREENING: HCPCS | Performed by: INTERNAL MEDICINE

## 2021-12-03 PROCEDURE — 84403 ASSAY OF TOTAL TESTOSTERONE: CPT | Performed by: INTERNAL MEDICINE

## 2021-12-03 PROCEDURE — 80061 LIPID PANEL: CPT | Performed by: INTERNAL MEDICINE

## 2021-12-03 PROCEDURE — 84443 ASSAY THYROID STIM HORMONE: CPT | Performed by: INTERNAL MEDICINE

## 2021-12-03 PROCEDURE — 90471 IMMUNIZATION ADMIN: CPT | Performed by: INTERNAL MEDICINE

## 2021-12-03 PROCEDURE — 80053 COMPREHEN METABOLIC PANEL: CPT | Performed by: INTERNAL MEDICINE

## 2021-12-03 RX ORDER — TADALAFIL 5 MG/1
5 TABLET ORAL DAILY
Qty: 90 TABLET | Refills: 3 | Status: SHIPPED | OUTPATIENT
Start: 2021-12-03 | End: 2022-06-06

## 2021-12-03 RX ORDER — FINASTERIDE 5 MG/1
5 TABLET, FILM COATED ORAL DAILY
Qty: 90 TABLET | Refills: 3 | Status: SHIPPED | OUTPATIENT
Start: 2021-12-03 | End: 2022-09-06

## 2021-12-03 RX ORDER — TESTOSTERONE GEL, 1% 10 MG/G
GEL TRANSDERMAL DAILY
Status: CANCELLED | OUTPATIENT
Start: 2021-12-03

## 2021-12-03 RX ORDER — TESTOSTERONE GEL, 1% 10 MG/G
GEL TRANSDERMAL
Qty: 150 G | Refills: 3 | Status: SHIPPED | OUTPATIENT
Start: 2021-12-03 | End: 2021-12-07

## 2021-12-03 RX ORDER — SIMVASTATIN 20 MG
20 TABLET ORAL AT BEDTIME
Qty: 90 TABLET | Refills: 3 | Status: SHIPPED | OUTPATIENT
Start: 2021-12-03 | End: 2022-11-29

## 2021-12-03 RX ORDER — SILDENAFIL 100 MG/1
50-100 TABLET, FILM COATED ORAL DAILY PRN
Qty: 12 TABLET | Refills: 3 | Status: SHIPPED | OUTPATIENT
Start: 2021-12-03 | End: 2022-05-16

## 2021-12-03 ASSESSMENT — ENCOUNTER SYMPTOMS
HEARTBURN: 1
FREQUENCY: 1

## 2021-12-03 ASSESSMENT — MIFFLIN-ST. JEOR: SCORE: 1525.36

## 2021-12-03 NOTE — PROGRESS NOTES
SUBJECTIVE:   CC: Anant Mena is an 56 year old male who presents for preventative health visit.       Patient has been advised of split billing requirements and indicates understanding: Yes  Healthy Habits:     Getting at least 3 servings of Calcium per day:  NO    Bi-annual eye exam:  Yes    Dental care twice a year:  Yes    Sleep apnea or symptoms of sleep apnea:  Daytime drowsiness    Diet:  Regular (no restrictions)    Frequency of exercise:  2-3 days/week    Duration of exercise:  15-30 minutes    Taking medications regularly:  Yes    Medication side effects:  None    PHQ-2 Total Score: 0    Additional concerns today:  No          PROBLEMS TO ADD ON...  Has H/O hyperlipidemia. On medical treatment and diet. No side effects. No muscle weakness, myalgias or upset stomach.   Has H/O BPH. On treatment with Cialis . Has chronic symptoms of frequency, decreased urinary stream , no dysuria. No side effects from medications.  Has h/o ED. On Cialis for daily use and PRN Viagra. Helps with symptoms.   Has had COVID, recovered, mild disease, presented with HA. 2 months ago , now back to normal.   Has had a back strain. Had MRI, L4-5 disc bulge. Had steroid injection and pain is better, but has persistent right leg weakness.     Today's PHQ-2 Score:   PHQ-2 ( 1999 Pfizer) 12/3/2021   Q1: Little interest or pleasure in doing things 0   Q2: Feeling down, depressed or hopeless 0   PHQ-2 Score 0   PHQ-2 Total Score (12-17 Years)- Positive if 3 or more points; Administer PHQ-A if positive -   Q1: Little interest or pleasure in doing things Not at all   Q2: Feeling down, depressed or hopeless Not at all   PHQ-2 Score 0       Abuse: Current or Past(Physical, Sexual or Emotional)- No  Do you feel safe in your environment? Yes    Have you ever done Advance Care Planning? (For example, a Health Directive, POLST, or a discussion with a medical provider or your loved ones about your wishes): No, advance care planning information  given to patient to review.  Patient declined advance care planning discussion at this time.    Social History     Tobacco Use     Smoking status: Never Smoker     Smokeless tobacco: Never Used   Substance Use Topics     Alcohol use: Yes     Comment: ocas         Alcohol Use 12/3/2021   Prescreen: >3 drinks/day or >7 drinks/week? No   Prescreen: >3 drinks/day or >7 drinks/week? -       Last PSA:   PSA   Date Value Ref Range Status   10/28/2020 1.00 0 - 4 ug/L Final     Comment:     Assay Method:  Chemiluminescence using Siemens Vista analyzer       Reviewed orders with patient. Reviewed health maintenance and updated orders accordingly - Yes  Lab work is in process  Labs reviewed in EPIC    Reviewed and updated as needed this visit by clinical staff  Tobacco  Allergies    Med Hx  Surg Hx  Fam Hx  Soc Hx       Reviewed and updated as needed this visit by Provider                   Review of Systems   Gastrointestinal: Positive for heartburn.   Genitourinary: Positive for frequency and impotence.     CONSTITUTIONAL: NEGATIVE for fever, chills, change in weight  INTEGUMENTARY/SKIN: NEGATIVE for worrisome rashes, moles or lesions  EYES: NEGATIVE for vision changes or irritation  ENT: NEGATIVE for ear, mouth and throat problems  RESP: NEGATIVE for significant cough or SOB  CV: NEGATIVE for chest pain, palpitations or peripheral edema  GI: NEGATIVE for nausea, abdominal pain, heartburn, or change in bowel habits   male: negative for dysuria, hematuria, +decreased urinary stream, +erectile dysfunction  MUSCULOSKELETAL: NEGATIVE for significant arthralgias or myalgia  NEURO: NEGATIVE for weakness, dizziness or paresthesias  PSYCHIATRIC: NEGATIVE for changes in mood or affect    OBJECTIVE:   There were no vitals taken for this visit.    Physical Exam  GENERAL: healthy, alert and no distress  EYES: Eyes grossly normal to inspection, PERRL and conjunctivae and sclerae normal  HENT: ear canals and TM's normal, nose and  mouth without ulcers or lesions  NECK: no adenopathy, no asymmetry, masses, or scars and thyroid normal to palpation  RESP: lungs clear to auscultation - no rales, rhonchi or wheezes  CV: regular rate and rhythm, normal S1 S2, no S3 or S4, no murmur, click or rub, no peripheral edema and peripheral pulses strong  ABDOMEN: soft, nontender, no hepatosplenomegaly, no masses and bowel sounds normal  RECTAL: normal sphincter tone, no rectal masses, prostate 2+normal size, smooth, nontender without nodules or masses  MS: no gross musculoskeletal defects noted, no edema  SKIN: no suspicious lesions or rashes  NEURO: Normal strength and tone, mentation intact and speech normal  PSYCH: mentation appears normal, affect normal/bright    Diagnostic Test Results:  Labs reviewed in Epic    ASSESSMENT/PLAN:       ICD-10-CM    1. Encounter for preventative adult health care examination  Z00.00 Lipid panel reflex to direct LDL Fasting     CBC with platelets     Comprehensive metabolic panel (BMP + Alb, Alk Phos, ALT, AST, Total. Bili, TP)     TSH with free T4 reflex     PSA, screen     Testosterone Free and Total     UA with Microscopic reflex to Culture - lab collect   2. Hypercholesteremia  E78.00 simvastatin (ZOCOR) 20 MG tablet     sildenafil (VIAGRA) 100 MG tablet     Lipid panel reflex to direct LDL Fasting     OFFICE/OUTPT VISIT,EST,LEVL III   3. Benign prostatic hyperplasia with urinary hesitancy  N40.1 tadalafil (CIALIS) 5 MG tablet    R39.11 finasteride (PROSCAR) 5 MG tablet     OFFICE/OUTPT VISIT,EST,LEVL III   4. Erectile dysfunction, unspecified erectile dysfunction type  N52.9 tadalafil (CIALIS) 5 MG tablet     OFFICE/OUTPT VISIT,EST,LEVL III   5. Screening for prostate cancer  Z12.5 PSA, screen   6. Low testosterone  R79.89 Testosterone Free and Total     testosterone (ANDROGEL 1% PUMP) 12.5 MG/ACT (1%) gel     OFFICE/OUTPT VISIT,EST,LEVL III   7. Need for prophylactic vaccination and inoculation against influenza   "Z23 INFLUENZA QUAD, RECOMBINANT, P-FREE (RIV4) (FLUBLOK)       Assess lab work   Cont treatment  Start on Proscar, advised for side effects   Follow up with ortho - spine for LBP/ leg weakness , DDD    Patient has been advised of split billing requirements and indicates understanding: Yes  COUNSELING:   Reviewed preventive health counseling, as reflected in patient instructions       Regular exercise       Healthy diet/nutrition       Vision screening       Hearing screening       Colon cancer screening       Prostate cancer screening    Estimated body mass index is 25.58 kg/m  as calculated from the following:    Height as of 10/28/20: 1.721 m (5' 7.75\").    Weight as of 10/28/20: 75.8 kg (167 lb).         He reports that he has never smoked. He has never used smokeless tobacco.      Counseling Resources:  ATP IV Guidelines  Pooled Cohorts Equation Calculator  FRAX Risk Assessment  ICSI Preventive Guidelines  Dietary Guidelines for Americans, 2010  USDA's MyPlate  ASA Prophylaxis  Lung CA Screening    Elgin Patel MD  [facility]  "

## 2021-12-03 NOTE — LETTER
December 17, 2021      Anant Mena  [address] [place] [address]        Dear ,    We are writing to inform you of your test results.    Your test results fall within the expected range(s) or remain unchanged from previous results.  Please continue with current treatment plan.    Resulted Orders   Lipid panel reflex to direct LDL Fasting   Result Value Ref Range    Cholesterol 206 (H) <200 mg/dL    Triglycerides 99 <150 mg/dL    Direct Measure HDL 59 >=40 mg/dL    LDL Cholesterol Calculated 127 (H) <=100 mg/dL    Non HDL Cholesterol 147 (H) <130 mg/dL    Patient Fasting > 8hrs? Yes     Narrative    Cholesterol  Desirable:  <200 mg/dL    Triglycerides  Normal:  Less than 150 mg/dL  Borderline High:  150-199 mg/dL  High:  200-499 mg/dL  Very High:  Greater than or equal to 500 mg/dL    Direct Measure HDL  Female:  Greater than or equal to 50 mg/dL   Male:  Greater than or equal to 40 mg/dL    LDL Cholesterol  Desirable:  <100mg/dL  Above Desirable:  100-129 mg/dL   Borderline High:  130-159 mg/dL   High:  160-189 mg/dL   Very High:  >= 190 mg/dL    Non HDL Cholesterol  Desirable:  130 mg/dL  Above Desirable:  130-159 mg/dL  Borderline High:  160-189 mg/dL  High:  190-219 mg/dL  Very High:  Greater than or equal to 220 mg/dL   CBC with platelets   Result Value Ref Range    WBC Count 4.7 4.0 - 11.0 10e3/uL    RBC Count 4.86 4.40 - 5.90 10e6/uL    Hemoglobin 15.0 13.3 - 17.7 g/dL    Hematocrit 45.2 40.0 - 53.0 %    MCV 93 78 - 100 fL    MCH 30.9 26.5 - 33.0 pg    MCHC 33.2 31.5 - 36.5 g/dL    RDW 13.2 10.0 - 15.0 %    Platelet Count 169 150 - 450 10e3/uL   Comprehensive metabolic panel (BMP + Alb, Alk Phos, ALT, AST, Total. Bili, TP)   Result Value Ref Range    Sodium 138 133 - 144 mmol/L    Potassium 4.3 3.4 - 5.3 mmol/L    Chloride 107 94 - 109 mmol/L    Carbon Dioxide (CO2) 28 20 - 32 mmol/L    Anion Gap 3 3 - 14 mmol/L    Urea Nitrogen 25 7 - 30 mg/dL    Creatinine 1.18 0.66 - 1.25 mg/dL     Calcium 8.8 8.5 - 10.1 mg/dL    Glucose 99 70 - 99 mg/dL    Alkaline Phosphatase 62 40 - 150 U/L    AST 21 0 - 45 U/L    ALT 32 0 - 70 U/L    Protein Total 7.4 6.8 - 8.8 g/dL    Albumin 3.8 3.4 - 5.0 g/dL    Bilirubin Total 0.3 0.2 - 1.3 mg/dL    GFR Estimate 68 >60 mL/min/1.73m2      Comment:      As of July 11, 2021, eGFR is calculated by the CKD-EPI creatinine equation, without race adjustment. eGFR can be influenced by muscle mass, exercise, and diet. The reported eGFR is an estimation only and is only applicable if the renal function is stable.   TSH with free T4 reflex   Result Value Ref Range    TSH 2.30 0.40 - 4.00 mU/L   PSA, screen   Result Value Ref Range    Prostate Specific Antigen Screen 1.11 0.00 - 4.00 ug/L   UA with Microscopic reflex to Culture - lab collect   Result Value Ref Range    Color Urine Yellow Colorless, Straw, Light Yellow, Yellow    Appearance Urine Clear Clear    Glucose Urine Negative Negative mg/dL    Bilirubin Urine Negative Negative    Ketones Urine Negative Negative mg/dL    Specific Gravity Urine >=1.030 1.003 - 1.035    Blood Urine Trace (A) Negative    pH Urine 5.5 5.0 - 7.0    Protein Albumin Urine Negative Negative mg/dL    Urobilinogen Urine 0.2 0.2, 1.0 E.U./dL    Nitrite Urine Negative Negative    Leukocyte Esterase Urine Negative Negative   Sex Hormone Binding Globulin   Result Value Ref Range    Sex Hormone Binding Globulin 25 11 - 80 nmol/L   Testosterone Free and Total   Result Value Ref Range    Free Testosterone Calculated 9.76 ng/dL      Comment:      Male Alessandro Ranges:  Alessandro Stage I: Less than or equal to 0.37 ng/dL  Alessandro Stage II: 0.03-2.1 ng/dL  Alessandro Stage III: 0.10-9.8 ng/dL  Alessandro Stage IV: 3.5-16.9 ng/dL  Alessandro Stage V: 4.1-23.9 ng/dL    Testosterone Total 411 240 - 950 ng/dL    Narrative    This test was developed and its performance characteristics determined by the [facility],  Special Chemistry Laboratory. It has  not been cleared or approved by the FDA. The laboratory is regulated under CLIA as qualified to perform high-complexity testing. This test is used for clinical purposes. It should not be regarded as investigational or for research.   Urine Microscopic   Result Value Ref Range    Bacteria Urine None Seen None Seen /HPF    RBC Urine 0-2 0-2 /HPF /HPF    WBC Urine 0-5 0-5 /HPF /HPF    Narrative    Urine Culture not indicated       If you have any questions or concerns, please call the clinic at the number listed above.       Sincerely,      Elgin Patel MD

## 2021-12-04 LAB
ALBUMIN SERPL-MCNC: 3.8 G/DL (ref 3.4–5)
ALP SERPL-CCNC: 62 U/L (ref 40–150)
ALT SERPL W P-5'-P-CCNC: 32 U/L (ref 0–70)
ANION GAP SERPL CALCULATED.3IONS-SCNC: 3 MMOL/L (ref 3–14)
AST SERPL W P-5'-P-CCNC: 21 U/L (ref 0–45)
BILIRUB SERPL-MCNC: 0.3 MG/DL (ref 0.2–1.3)
BUN SERPL-MCNC: 25 MG/DL (ref 7–30)
CALCIUM SERPL-MCNC: 8.8 MG/DL (ref 8.5–10.1)
CHLORIDE BLD-SCNC: 107 MMOL/L (ref 94–109)
CO2 SERPL-SCNC: 28 MMOL/L (ref 20–32)
CREAT SERPL-MCNC: 1.18 MG/DL (ref 0.66–1.25)
GFR SERPL CREATININE-BSD FRML MDRD: 68 ML/MIN/1.73M2
GLUCOSE BLD-MCNC: 99 MG/DL (ref 70–99)
POTASSIUM BLD-SCNC: 4.3 MMOL/L (ref 3.4–5.3)
PROT SERPL-MCNC: 7.4 G/DL (ref 6.8–8.8)
PSA SERPL-MCNC: 1.11 UG/L (ref 0–4)
SHBG SERPL-SCNC: 25 NMOL/L (ref 11–80)
SODIUM SERPL-SCNC: 138 MMOL/L (ref 133–144)
TSH SERPL DL<=0.005 MIU/L-ACNC: 2.3 MU/L (ref 0.4–4)

## 2021-12-06 ENCOUNTER — TELEPHONE (OUTPATIENT)
Dept: INTERNAL MEDICINE | Facility: CLINIC | Age: 56
End: 2021-12-06
Payer: COMMERCIAL

## 2021-12-06 DIAGNOSIS — R79.89 LOW TESTOSTERONE: ICD-10-CM

## 2021-12-06 NOTE — TELEPHONE ENCOUNTER
Prior Authorization Retail Medication Request    Medication/Dose: testosterone (ANDROGEL 1% PUMP) 12.5 MG/ACT (1%) gel  ICD code (if different than what is on RX):    Previously Tried and Failed:    Rationale:      Insurance Name:  CoverMyMeds   Insurance ID:  ARMANI      Pharmacy Information (if different than what is on RX)  Name:  [facility]  Phone:

## 2021-12-07 DIAGNOSIS — R79.89 LOW TESTOSTERONE: ICD-10-CM

## 2021-12-07 LAB
TESTOST FREE SERPL-MCNC: 9.76 NG/DL
TESTOST SERPL-MCNC: 411 NG/DL (ref 240–950)

## 2021-12-07 RX ORDER — TESTOSTERONE GEL, 1% 10 MG/G
GEL TRANSDERMAL
Qty: 150 G | Refills: 3 | Status: SHIPPED | OUTPATIENT
Start: 2021-12-07 | End: 2022-06-06

## 2021-12-07 RX ORDER — TESTOSTERONE 50 MG/5G
50 GEL TRANSDERMAL DAILY
Qty: 30 PACKET | Refills: 0 | Status: SHIPPED | OUTPATIENT
Start: 2021-12-07 | End: 2022-06-06

## 2021-12-07 NOTE — TELEPHONE ENCOUNTER
PA Initiation    Medication: testosterone (ANDROGEL 1% PUMP) 12.5 MG/ACT (1%) gel  Insurance Company: [employer] ([facility]) - Phone [phone] Fax [phone]  Pharmacy Filling the Rx: [facility] - [place], [place] - [address] [place] [address], [address]  Filling Pharmacy Phone: [phone]  Filling Pharmacy Fax: [phone]  Start Date: 12/7/2021

## 2021-12-07 NOTE — TELEPHONE ENCOUNTER
PRIOR AUTHORIZATION DENIED    Medication: testosterone (ANDROGEL 1% PUMP) 12.5 MG/ACT (1%) gel    Denial Date: 12/7/2021    Denial Rationale: Patient has to first try/fail Testim (also needs PA).        Appeal Information: If provider would like to appeal this decision we will need a detailed letter of medical necessity to start the process. Then re-route this request back to the PA pool.

## 2021-12-08 ENCOUNTER — TELEPHONE (OUTPATIENT)
Dept: INTERNAL MEDICINE | Facility: CLINIC | Age: 56
End: 2021-12-08
Payer: COMMERCIAL

## 2021-12-08 DIAGNOSIS — R79.89 LOW TESTOSTERONE: Primary | ICD-10-CM

## 2021-12-08 NOTE — TELEPHONE ENCOUNTER
Prior Authorization Retail Medication Request    Medication/Dose: TESTIM 50 MG/5GM (1%) topical gel  ICD code (if different than what is on RX):  Previously Tried and Failed:  Rationale:    Insurance Name: UNKNOWN  Insurance ID: UNKNOWN    Pharmacy Information (if different than what is on RX)  Name: [facility]  Phone: [phone]    Please include previous medications tried and failed.  Please ask insurance for medications on formulary.

## 2021-12-08 NOTE — TELEPHONE ENCOUNTER
Central Prior Authorization Team   Phone: [phone]      PA Initiation    Medication: TESTIM 50 MG/5GM (1%) topical gel-Initiated  Insurance Company: [employer] ([facility]) - Phone [phone] Fax [phone]  Pharmacy Filling the Rx: [facility] - [place], [place] - [address] [facility], [address]  Filling Pharmacy Phone: [phone]  Filling Pharmacy Fax:    Start Date: 12/8/2021

## 2021-12-09 NOTE — TELEPHONE ENCOUNTER
Received a fax from insurance saying PA was denied but didn't give a clear reason why.  Called and spoke with Gregory at insurance he could not give better clarification so transferred me to Robyn in appeals.  Robyn states she doesn't show specifics either but was able to do another PA via the phone.  Case [id number].  A new determination will be faxed within 3 days.

## 2021-12-09 NOTE — TELEPHONE ENCOUNTER
PRIOR AUTHORIZATION DENIED    Medication: TESTIM 50 MG/5GM (1%) topical gel-DENIED    Denial Date: 12/9/2021    Denial Rational: Called insurance and they still could not give me a better explanation of the denial reason.          Appeal Information:

## 2021-12-10 NOTE — TELEPHONE ENCOUNTER
I am not sure why it is not covered   We can recheck his testosterone level in 3 months and resubmit the prescription if it is low.

## 2021-12-13 RX ORDER — TESTOSTERONE 1.62 MG/G
60.75 GEL TRANSDERMAL DAILY
Qty: 150 G | Refills: 5 | Status: SHIPPED | OUTPATIENT
Start: 2021-12-13 | End: 2022-05-16

## 2021-12-13 NOTE — TELEPHONE ENCOUNTER
Call to pt. He states the incorrect dose of Androgel was faxed in.   He gets generic Androgel 1.62% and his insurance has been covering this.     He reports that he uses 3 pumps daily.     If this isn't covered then he will call insurance himself.     Pended new Rx.

## 2021-12-16 LAB
CHOLEST SERPL-MCNC: 206 MG/DL
FASTING STATUS PATIENT QL REPORTED: YES
HDLC SERPL-MCNC: 59 MG/DL
LDLC SERPL CALC-MCNC: 127 MG/DL
NONHDLC SERPL-MCNC: 147 MG/DL
TRIGL SERPL-MCNC: 99 MG/DL

## 2022-05-12 DIAGNOSIS — E78.00 HYPERCHOLESTEREMIA: ICD-10-CM

## 2022-05-12 DIAGNOSIS — R79.89 LOW TESTOSTERONE: ICD-10-CM

## 2022-05-16 RX ORDER — SILDENAFIL 100 MG/1
TABLET, FILM COATED ORAL
Qty: 13 TABLET | Refills: 0 | Status: SHIPPED | OUTPATIENT
Start: 2022-05-16 | End: 2022-09-06

## 2022-05-16 RX ORDER — TESTOSTERONE 1.62 MG/G
GEL TRANSDERMAL
Qty: 150 G | Refills: 1 | Status: SHIPPED | OUTPATIENT
Start: 2022-05-16 | End: 2022-09-06

## 2022-05-16 NOTE — TELEPHONE ENCOUNTER
Ok to fill Viagra     Routing to PCP for review on androgel :   Androgen Agents Failed 05/12/2022 03:56 PM   Protocol Details  HCT less than 54% on file within past 12 mos    Refills for this classification require provider review

## 2022-05-25 ENCOUNTER — TRANSFERRED RECORDS (OUTPATIENT)
Dept: HEALTH INFORMATION MANAGEMENT | Facility: CLINIC | Age: 57
End: 2022-05-25
Payer: COMMERCIAL

## 2022-06-06 ENCOUNTER — OFFICE VISIT (OUTPATIENT)
Dept: INTERNAL MEDICINE | Facility: CLINIC | Age: 57
End: 2022-06-06
Payer: COMMERCIAL

## 2022-06-06 VITALS
BODY MASS INDEX: 25.43 KG/M2 | HEART RATE: 112 BPM | OXYGEN SATURATION: 99 % | SYSTOLIC BLOOD PRESSURE: 122 MMHG | DIASTOLIC BLOOD PRESSURE: 60 MMHG | WEIGHT: 166 LBS | TEMPERATURE: 97.7 F | RESPIRATION RATE: 16 BRPM

## 2022-06-06 DIAGNOSIS — H92.02 LEFT EAR PAIN: Primary | ICD-10-CM

## 2022-06-06 DIAGNOSIS — F41.8 SITUATIONAL ANXIETY: ICD-10-CM

## 2022-06-06 DIAGNOSIS — M51.369 DDD (DEGENERATIVE DISC DISEASE), LUMBAR: ICD-10-CM

## 2022-06-06 DIAGNOSIS — F40.243 ANXIETY WITH FLYING: ICD-10-CM

## 2022-06-06 DIAGNOSIS — K21.9 GASTROESOPHAGEAL REFLUX DISEASE WITHOUT ESOPHAGITIS: ICD-10-CM

## 2022-06-06 PROCEDURE — 99214 OFFICE O/P EST MOD 30 MIN: CPT | Performed by: INTERNAL MEDICINE

## 2022-06-06 RX ORDER — METHYLPREDNISOLONE 4 MG
TABLET, DOSE PACK ORAL
Qty: 21 TABLET | Refills: 0 | Status: SHIPPED | OUTPATIENT
Start: 2022-06-06 | End: 2022-10-07

## 2022-06-06 RX ORDER — ALPRAZOLAM 0.5 MG
0.5 TABLET ORAL 3 TIMES DAILY PRN
Qty: 4 TABLET | Refills: 0 | Status: SHIPPED | OUTPATIENT
Start: 2022-06-06 | End: 2022-10-07

## 2022-06-06 RX ORDER — FAMOTIDINE 20 MG/1
20 TABLET, FILM COATED ORAL 2 TIMES DAILY
COMMUNITY
Start: 2022-06-06

## 2022-06-06 RX ORDER — TRAMADOL HYDROCHLORIDE 50 MG/1
50 TABLET ORAL EVERY 6 HOURS PRN
Qty: 10 TABLET | Refills: 0 | Status: SHIPPED | OUTPATIENT
Start: 2022-06-06 | End: 2022-06-09

## 2022-06-06 NOTE — PROGRESS NOTES
Key Ny is a 56 year old who presents for the following health issues     HPI     Left ear pain off and on     Presents with left ear pain for 6  Months on and off. No ringing, no hearing loss.   Has h/o seasonal allergies.   Has had bad headaches 2 days. Frontal. Two episodes. No recurrence.   Has h/o GERD. On Pepcid, helps with symptoms. Has flare up of pain with dietary mistakes.   Has h/o anxiety with flying. Has taken Xanax as needed for flights, helps.   Has had LBP radiating to the right leg, has spinal stenosis. Seen ortho , recommended PRN Medrol dose pack and analgesics.           Review of Systems   Constitutional, HEENT, cardiovascular, pulmonary, gi and gu systems are negative, except as otherwise noted.      Objective    /60   Pulse 112   Temp 97.7  F (36.5  C) (Tympanic)   Resp 16   Wt 75.3 kg (166 lb)   SpO2 99%   BMI 25.43 kg/m    Body mass index is 25.43 kg/m .  Physical Exam   GENERAL: healthy, alert and no distress  EYES: Eyes grossly normal to inspection, PERRL and conjunctivae and sclerae normal  HENT: ear canals and TM's normal, nose and mouth without ulcers or lesions  NECK: no adenopathy, no asymmetry, masses, or scars and thyroid normal to palpation  MS: no gross musculoskeletal defects noted, no edema    Office Visit on 12/03/2021   Component Date Value Ref Range Status     Cholesterol 12/03/2021 206 (A) <200 mg/dL Final     Triglycerides 12/03/2021 99  <150 mg/dL Final     Direct Measure HDL 12/03/2021 59  >=40 mg/dL Final     LDL Cholesterol Calculated 12/03/2021 127 (A) <=100 mg/dL Final     Non HDL Cholesterol 12/03/2021 147 (A) <130 mg/dL Final     Patient Fasting > 8hrs? 12/03/2021 Yes   Final     WBC Count 12/03/2021 4.7  4.0 - 11.0 10e3/uL Final     RBC Count 12/03/2021 4.86  4.40 - 5.90 10e6/uL Final     Hemoglobin 12/03/2021 15.0  13.3 - 17.7 g/dL Final     Hematocrit 12/03/2021 45.2  40.0 - 53.0 % Final     MCV 12/03/2021 93  78 - 100 fL Final     MCH  12/03/2021 30.9  26.5 - 33.0 pg Final     MCHC 12/03/2021 33.2  31.5 - 36.5 g/dL Final     RDW 12/03/2021 13.2  10.0 - 15.0 % Final     Platelet Count 12/03/2021 169  150 - 450 10e3/uL Final     Sodium 12/03/2021 138  133 - 144 mmol/L Final     Potassium 12/03/2021 4.3  3.4 - 5.3 mmol/L Final     Chloride 12/03/2021 107  94 - 109 mmol/L Final     Carbon Dioxide (CO2) 12/03/2021 28  20 - 32 mmol/L Final     Anion Gap 12/03/2021 3  3 - 14 mmol/L Final     Urea Nitrogen 12/03/2021 25  7 - 30 mg/dL Final     Creatinine 12/03/2021 1.18  0.66 - 1.25 mg/dL Final     Calcium 12/03/2021 8.8  8.5 - 10.1 mg/dL Final     Glucose 12/03/2021 99  70 - 99 mg/dL Final     Alkaline Phosphatase 12/03/2021 62  40 - 150 U/L Final     AST 12/03/2021 21  0 - 45 U/L Final     ALT 12/03/2021 32  0 - 70 U/L Final     Protein Total 12/03/2021 7.4  6.8 - 8.8 g/dL Final     Albumin 12/03/2021 3.8  3.4 - 5.0 g/dL Final     Bilirubin Total 12/03/2021 0.3  0.2 - 1.3 mg/dL Final     GFR Estimate 12/03/2021 68  >60 mL/min/1.73m2 Final    As of July 11, 2021, eGFR is calculated by the CKD-EPI creatinine equation, without race adjustment. eGFR can be influenced by muscle mass, exercise, and diet. The reported eGFR is an estimation only and is only applicable if the renal function is stable.     TSH 12/03/2021 2.30  0.40 - 4.00 mU/L Final     Prostate Specific Antigen Screen 12/03/2021 1.11  0.00 - 4.00 ug/L Final     Color Urine 12/03/2021 Yellow  Colorless, Straw, Light Yellow, Yellow Final     Appearance Urine 12/03/2021 Clear  Clear Final     Glucose Urine 12/03/2021 Negative  Negative mg/dL Final     Bilirubin Urine 12/03/2021 Negative  Negative Final     Ketones Urine 12/03/2021 Negative  Negative mg/dL Final     Specific Gravity Urine 12/03/2021 >=1.030  1.003 - 1.035 Final     Blood Urine 12/03/2021 Trace (A) Negative Final     pH Urine 12/03/2021 5.5  5.0 - 7.0 Final     Protein Albumin Urine 12/03/2021 Negative  Negative mg/dL Final      Urobilinogen Urine 12/03/2021 0.2  0.2, 1.0 E.U./dL Final     Nitrite Urine 12/03/2021 Negative  Negative Final     Leukocyte Esterase Urine 12/03/2021 Negative  Negative Final     Sex Hormone Binding Globulin 12/03/2021 25  11 - 80 nmol/L Final     Free Testosterone Calculated 12/03/2021 9.76  ng/dL Final    Male Alessandro Ranges:  Alessandro Stage I: Less than or equal to 0.37 ng/dL  Alessandro Stage II: 0.03-2.1 ng/dL  Alessandro Stage III: 0.10-9.8 ng/dL  Alessandro Stage IV: 3.5-16.9 ng/dL  Alessandro Stage V: 4.1-23.9 ng/dL     Testosterone Total 12/03/2021 411  240 - 950 ng/dL Final     Bacteria Urine 12/03/2021 None Seen  None Seen /HPF Final     RBC Urine 12/03/2021 0-2  0-2 /HPF /HPF Final     WBC Urine 12/03/2021 0-5  0-5 /HPF /HPF Final       ASSESSMENT and PLAN:  (H92.02) Left ear pain  (primary encounter diagnosis)  Comment: start PPI for 2 weeks , try mouth guard, assess with dentis   Plan: if not improved, assess with ENT       (K21.9) Gastroesophageal reflux disease without esophagitis  Comment: cont treatment, PPI for 2 weeks   Plan: famotidine (PEPCID) 20 MG tablet            (F41.8) Situational anxiety  Comment: PRN Xanax   Plan: ALPRAZolam (XANAX) 0.5 MG tablet        Advised for side effects     (F40.243) Anxiety with flying  Comment: Xanax PRN       (M51.36) DDD (degenerative disc disease), lumbar  Comment: PRN analgesics, steroid   Plan: traMADol (ULTRAM) 50 MG tablet,         methylPREDNISolone (MEDROL DOSEPAK) 4 MG tablet        therapy pack

## 2022-09-05 DIAGNOSIS — R79.89 LOW TESTOSTERONE: ICD-10-CM

## 2022-09-05 DIAGNOSIS — R39.11 BENIGN PROSTATIC HYPERPLASIA WITH URINARY HESITANCY: ICD-10-CM

## 2022-09-05 DIAGNOSIS — E78.00 HYPERCHOLESTEREMIA: ICD-10-CM

## 2022-09-05 DIAGNOSIS — N40.1 BENIGN PROSTATIC HYPERPLASIA WITH URINARY HESITANCY: ICD-10-CM

## 2022-09-06 RX ORDER — TESTOSTERONE 1.62 MG/G
GEL TRANSDERMAL
Qty: 150 G | Refills: 0 | Status: SHIPPED | OUTPATIENT
Start: 2022-09-06 | End: 2022-11-28

## 2022-09-06 RX ORDER — SILDENAFIL 100 MG/1
TABLET, FILM COATED ORAL
Qty: 13 TABLET | Refills: 0 | Status: SHIPPED | OUTPATIENT
Start: 2022-09-06 | End: 2022-10-07

## 2022-09-06 RX ORDER — FINASTERIDE 5 MG/1
TABLET, FILM COATED ORAL
Qty: 90 TABLET | Refills: 3 | Status: SHIPPED | OUTPATIENT
Start: 2022-09-06 | End: 2023-10-03

## 2022-10-07 ENCOUNTER — OFFICE VISIT (OUTPATIENT)
Dept: INTERNAL MEDICINE | Facility: CLINIC | Age: 57
End: 2022-10-07
Payer: COMMERCIAL

## 2022-10-07 VITALS
DIASTOLIC BLOOD PRESSURE: 66 MMHG | OXYGEN SATURATION: 99 % | TEMPERATURE: 97.1 F | HEART RATE: 67 BPM | RESPIRATION RATE: 12 BRPM | SYSTOLIC BLOOD PRESSURE: 124 MMHG | BODY MASS INDEX: 24.9 KG/M2 | WEIGHT: 164.3 LBS | HEIGHT: 68 IN

## 2022-10-07 DIAGNOSIS — R79.89 LOW TESTOSTERONE: ICD-10-CM

## 2022-10-07 DIAGNOSIS — Z12.5 SCREENING FOR PROSTATE CANCER: ICD-10-CM

## 2022-10-07 DIAGNOSIS — E78.5 HYPERLIPIDEMIA LDL GOAL <130: ICD-10-CM

## 2022-10-07 DIAGNOSIS — N40.1 BENIGN PROSTATIC HYPERPLASIA WITH NOCTURIA: ICD-10-CM

## 2022-10-07 DIAGNOSIS — Z00.00 ENCOUNTER FOR PREVENTATIVE ADULT HEALTH CARE EXAMINATION: Primary | ICD-10-CM

## 2022-10-07 DIAGNOSIS — Z11.4 SCREENING FOR HIV (HUMAN IMMUNODEFICIENCY VIRUS): ICD-10-CM

## 2022-10-07 DIAGNOSIS — R35.1 BENIGN PROSTATIC HYPERPLASIA WITH NOCTURIA: ICD-10-CM

## 2022-10-07 DIAGNOSIS — Z11.59 NEED FOR HEPATITIS C SCREENING TEST: ICD-10-CM

## 2022-10-07 DIAGNOSIS — E78.00 HYPERCHOLESTEREMIA: ICD-10-CM

## 2022-10-07 DIAGNOSIS — Z23 HIGH PRIORITY FOR 2019-NCOV VACCINE: ICD-10-CM

## 2022-10-07 LAB
ALBUMIN UR-MCNC: NEGATIVE MG/DL
APPEARANCE UR: CLEAR
BACTERIA #/AREA URNS HPF: NORMAL /HPF
BILIRUB UR QL STRIP: NEGATIVE
COLOR UR AUTO: YELLOW
ERYTHROCYTE [DISTWIDTH] IN BLOOD BY AUTOMATED COUNT: 13.3 % (ref 10–15)
GLUCOSE UR STRIP-MCNC: NEGATIVE MG/DL
HCT VFR BLD AUTO: 45.7 % (ref 40–53)
HGB BLD-MCNC: 15.4 G/DL (ref 13.3–17.7)
HGB UR QL STRIP: NEGATIVE
KETONES UR STRIP-MCNC: NEGATIVE MG/DL
LEUKOCYTE ESTERASE UR QL STRIP: NEGATIVE
MCH RBC QN AUTO: 30.7 PG (ref 26.5–33)
MCHC RBC AUTO-ENTMCNC: 33.7 G/DL (ref 31.5–36.5)
MCV RBC AUTO: 91 FL (ref 78–100)
NITRATE UR QL: NEGATIVE
PH UR STRIP: 7 [PH] (ref 5–7)
PLATELET # BLD AUTO: 186 10E3/UL (ref 150–450)
RBC # BLD AUTO: 5.02 10E6/UL (ref 4.4–5.9)
RBC #/AREA URNS AUTO: NORMAL /HPF
SP GR UR STRIP: 1.02 (ref 1–1.03)
UROBILINOGEN UR STRIP-ACNC: 0.2 E.U./DL
WBC # BLD AUTO: 5.9 10E3/UL (ref 4–11)
WBC #/AREA URNS AUTO: NORMAL /HPF

## 2022-10-07 PROCEDURE — 90682 RIV4 VACC RECOMBINANT DNA IM: CPT | Performed by: INTERNAL MEDICINE

## 2022-10-07 PROCEDURE — 99213 OFFICE O/P EST LOW 20 MIN: CPT | Mod: 25 | Performed by: INTERNAL MEDICINE

## 2022-10-07 PROCEDURE — 84443 ASSAY THYROID STIM HORMONE: CPT | Performed by: INTERNAL MEDICINE

## 2022-10-07 PROCEDURE — 36415 COLL VENOUS BLD VENIPUNCTURE: CPT | Performed by: INTERNAL MEDICINE

## 2022-10-07 PROCEDURE — 84403 ASSAY OF TOTAL TESTOSTERONE: CPT | Performed by: INTERNAL MEDICINE

## 2022-10-07 PROCEDURE — 80053 COMPREHEN METABOLIC PANEL: CPT | Performed by: INTERNAL MEDICINE

## 2022-10-07 PROCEDURE — 81001 URINALYSIS AUTO W/SCOPE: CPT | Performed by: INTERNAL MEDICINE

## 2022-10-07 PROCEDURE — 84270 ASSAY OF SEX HORMONE GLOBUL: CPT | Performed by: INTERNAL MEDICINE

## 2022-10-07 PROCEDURE — G0103 PSA SCREENING: HCPCS | Performed by: INTERNAL MEDICINE

## 2022-10-07 PROCEDURE — 91312 COVID-19,PF,PFIZER BOOSTER BIVALENT: CPT | Performed by: INTERNAL MEDICINE

## 2022-10-07 PROCEDURE — 80061 LIPID PANEL: CPT | Performed by: INTERNAL MEDICINE

## 2022-10-07 PROCEDURE — 0124A COVID-19,PF,PFIZER BOOSTER BIVALENT: CPT | Performed by: INTERNAL MEDICINE

## 2022-10-07 PROCEDURE — 99396 PREV VISIT EST AGE 40-64: CPT | Mod: 25 | Performed by: INTERNAL MEDICINE

## 2022-10-07 PROCEDURE — 85027 COMPLETE CBC AUTOMATED: CPT | Performed by: INTERNAL MEDICINE

## 2022-10-07 PROCEDURE — 86803 HEPATITIS C AB TEST: CPT | Performed by: INTERNAL MEDICINE

## 2022-10-07 PROCEDURE — 90471 IMMUNIZATION ADMIN: CPT | Performed by: INTERNAL MEDICINE

## 2022-10-07 PROCEDURE — 87389 HIV-1 AG W/HIV-1&-2 AB AG IA: CPT | Performed by: INTERNAL MEDICINE

## 2022-10-07 RX ORDER — SILDENAFIL 100 MG/1
TABLET, FILM COATED ORAL
Qty: 13 TABLET | Refills: 11 | Status: SHIPPED | OUTPATIENT
Start: 2022-10-07 | End: 2023-10-17

## 2022-10-07 RX ORDER — TAMSULOSIN HYDROCHLORIDE 0.4 MG/1
0.4 CAPSULE ORAL DAILY
Qty: 90 CAPSULE | Refills: 3 | Status: SHIPPED | OUTPATIENT
Start: 2022-10-07 | End: 2022-12-08 | Stop reason: SINTOL

## 2022-10-07 ASSESSMENT — ENCOUNTER SYMPTOMS
SHORTNESS OF BREATH: 0
EYE PAIN: 0
ARTHRALGIAS: 0
COUGH: 0
NAUSEA: 0
DIARRHEA: 0
MYALGIAS: 0
FREQUENCY: 1
DYSURIA: 0
ABDOMINAL PAIN: 0
NERVOUS/ANXIOUS: 0
WEAKNESS: 0
HEMATOCHEZIA: 0
DIZZINESS: 0
CHILLS: 0
PALPITATIONS: 0
CONSTIPATION: 0
SORE THROAT: 0
FEVER: 0
HEMATURIA: 0
PARESTHESIAS: 0
HEADACHES: 0
JOINT SWELLING: 0
HEARTBURN: 0

## 2022-10-07 NOTE — PROGRESS NOTES
SUBJECTIVE:   CC: Anant is an 57 year old who presents for preventative health visit.       Patient has been advised of split billing requirements and indicates understanding: Yes  Healthy Habits:     Getting at least 3 servings of Calcium per day:  NO    Bi-annual eye exam:  Yes    Dental care twice a year:  Yes    Sleep apnea or symptoms of sleep apnea:  Daytime drowsiness    Diet:  Regular (no restrictions)    Frequency of exercise:  2-3 days/week    Duration of exercise:  15-30 minutes    Taking medications regularly:  Yes    Medication side effects:  None    PHQ-2 Total Score: 0    Additional concerns today:  Yes          PROBLEMS TO ADD ON...  Has H/O hyperlipidemia. On medical treatment and diet. No side effects. No muscle weakness, myalgias or upset stomach.   Has H/O BPH. On treatment with Proscar . Has chronic symptoms of frequency, decreased urinary stream and nocturia. No side effects from medications.  Has h/o low testosterone. On topical treatment. Feels well, except for fatigue.   Reports 2 episodes of pain in the left side of the face and jaw at night, and once sharp pain on the top of the head. No recurrence. No pain with exercise. No neurologic deficits. No family history of aneurysms or strokes.     Today's PHQ-2 Score:   PHQ-2 ( 1999 Pf[employer]) 10/7/2022   Q1: Little interest or pleasure in doing things 0   Q2: Feeling down, depressed or hopeless 0   PHQ-2 Score 0   PHQ-2 Total Score (12-17 Years)- Positive if 3 or more points; Administer PHQ-A if positive -   Q1: Little interest or pleasure in doing things Not at all   Q2: Feeling down, depressed or hopeless Not at all   PHQ-2 Score 0       Abuse: Current or Past(Physical, Sexual or Emotional)- No  Do you feel safe in your environment? Yes        Social History     Tobacco Use     Smoking status: Never Smoker     Smokeless tobacco: Never Used   Substance Use Topics     Alcohol use: Yes     Comment: ocas         Alcohol Use 10/7/2022   Prescreen: >3  "drinks/day or >7 drinks/week? No   Prescreen: >3 drinks/day or >7 drinks/week? -       Last PSA:   PSA   Date Value Ref Range Status   10/28/2020 1.00 0 - 4 ug/L Final     Comment:     Assay Method:  Chemiluminescence using Siemens Vista analyzer     Prostate Specific Antigen Screen   Date Value Ref Range Status   12/03/2021 1.11 0.00 - 4.00 ug/L Final       Reviewed orders with patient. Reviewed health maintenance and updated orders accordingly - Yes  Lab work is in process    Reviewed and updated as needed this visit by clinical staff   Tobacco  Allergies  Meds  Problems  Med Hx  Surg Hx  Fam Hx            Reviewed and updated as needed this visit by Provider                       Review of Systems   Constitutional: Negative for chills and fever.   HENT: Negative for congestion, ear pain, hearing loss and sore throat.    Eyes: Negative for pain and visual disturbance.   Respiratory: Negative for cough and shortness of breath.    Cardiovascular: Negative for chest pain, palpitations and peripheral edema.   Gastrointestinal: Negative for abdominal pain, constipation, diarrhea, heartburn, hematochezia and nausea.   Genitourinary: Positive for frequency and urgency. Negative for dysuria, genital sores, hematuria and impotence.   Musculoskeletal: Negative for arthralgias, joint swelling and myalgias.   Skin: Negative for rash.   Neurological: Negative for dizziness, weakness, headaches and paresthesias.   Psychiatric/Behavioral: Negative for mood changes. The patient is not nervous/anxious.          OBJECTIVE:   /66 (BP Location: Left arm, Cuff Size: Adult Regular)   Pulse 67   Temp 97.1  F (36.2  C) (Tympanic)   Resp 12   Ht 1.734 m (5' 8.25\")   Wt 74.5 kg (164 lb 4.8 oz)   SpO2 99%   BMI 24.80 kg/m      Physical Exam  GENERAL: healthy, alert and no distress  EYES: Eyes grossly normal to inspection, PERRL and conjunctivae and sclerae normal  HENT: ear canals and TM's normal, nose and mouth without " ulcers or lesions  NECK: no adenopathy, no asymmetry, masses, or scars and thyroid normal to palpation  RESP: lungs clear to auscultation - no rales, rhonchi or wheezes  CV: regular rate and rhythm, normal S1 S2, no S3 or S4, no murmur, click or rub, no peripheral edema and peripheral pulses strong  ABDOMEN: soft, nontender, no hepatosplenomegaly, no masses and bowel sounds normal  MS: no gross musculoskeletal defects noted, no edema  SKIN: no suspicious lesions or rashes  NEURO: Normal strength and tone, mentation intact and speech normal  PSYCH: mentation appears normal, affect normal/bright    Diagnostic Test Results:  Labs reviewed in Epic    ASSESSMENT/PLAN:       ICD-10-CM    1. Encounter for preventative adult health care examination  Z00.00 Lipid panel reflex to direct LDL Fasting     PSA, screen     CBC with platelets     Comprehensive metabolic panel (BMP + Alb, Alk Phos, ALT, AST, Total. Bili, TP)     TSH with free T4 reflex     UA with Microscopic reflex to Culture - lab collect     Testosterone Free and Total   2. Screening for HIV (human immunodeficiency virus)  Z11.4 HIV Antigen Antibody Combo   3. Need for hepatitis C screening test  Z11.59 Hepatitis C Screen Reflex to HCV RNA Quant and Genotype   4. High priority for 2019-nCoV vaccine  Z23    5. Hypercholesteremia  E78.00 sildenafil (VIAGRA) 100 MG tablet   6. Screening for prostate cancer  Z12.5 PSA, screen   7. Low testosterone  R79.89 Testosterone Free and Total     OFFICE/OUTPT VISIT,EST,LEVL III   8. Hyperlipidemia LDL goal <130  E78.5 OFFICE/OUTPT VISIT,EST,LEVL III   9. Benign prostatic hyperplasia with nocturia  N40.1 tamsulosin (FLOMAX) 0.4 MG capsule    R35.1 OFFICE/OUTPT VISIT,EST,LEVL III     Assess lab work   Continue treatment   Start on Flomax in addition to proscar , advised for side effects, not to take with Viagra   Monitor for recurrent HA , consider MRI  Brain     Patient has been advised of split billing requirements and  "indicates understanding: Yes    COUNSELING:   Reviewed preventive health counseling, as reflected in patient instructions       Regular exercise       Healthy diet/nutrition       Vision screening       Hearing screening       Colorectal cancer screening       Prostate cancer screening    Estimated body mass index is 24.8 kg/m  as calculated from the following:    Height as of this encounter: 1.734 m (5' 8.25\").    Weight as of this encounter: 74.5 kg (164 lb 4.8 oz).         He reports that he has never smoked. He has never used smokeless tobacco.      Counseling Resources:  ATP IV Guidelines  Pooled Cohorts Equation Calculator  FRAX Risk Assessment  ICSI Preventive Guidelines  Dietary Guidelines for Americans, 2010  USDA's MyPlate  ASA Prophylaxis  Lung CA Screening    Elgin Patel MD  [facility]  "

## 2022-10-08 LAB
ALBUMIN SERPL-MCNC: 4.6 G/DL (ref 3.4–5)
ALP SERPL-CCNC: 73 U/L (ref 40–150)
ALT SERPL W P-5'-P-CCNC: 31 U/L (ref 0–70)
ANION GAP SERPL CALCULATED.3IONS-SCNC: 7 MMOL/L (ref 3–14)
AST SERPL W P-5'-P-CCNC: 24 U/L (ref 0–45)
BILIRUB SERPL-MCNC: 0.7 MG/DL (ref 0.2–1.3)
BUN SERPL-MCNC: 20 MG/DL (ref 7–30)
CALCIUM SERPL-MCNC: 9.7 MG/DL (ref 8.5–10.1)
CHLORIDE BLD-SCNC: 106 MMOL/L (ref 94–109)
CHOLEST SERPL-MCNC: 191 MG/DL
CO2 SERPL-SCNC: 26 MMOL/L (ref 20–32)
CREAT SERPL-MCNC: 1.15 MG/DL (ref 0.66–1.25)
FASTING STATUS PATIENT QL REPORTED: YES
GFR SERPL CREATININE-BSD FRML MDRD: 73 ML/MIN/1.73M2
GLUCOSE BLD-MCNC: 99 MG/DL (ref 70–99)
HDLC SERPL-MCNC: 61 MG/DL
LDLC SERPL CALC-MCNC: 110 MG/DL
NONHDLC SERPL-MCNC: 130 MG/DL
POTASSIUM BLD-SCNC: 4.8 MMOL/L (ref 3.4–5.3)
PROT SERPL-MCNC: 7.5 G/DL (ref 6.8–8.8)
PSA SERPL-MCNC: 1.52 UG/L (ref 0–4)
SODIUM SERPL-SCNC: 139 MMOL/L (ref 133–144)
TRIGL SERPL-MCNC: 102 MG/DL
TSH SERPL DL<=0.005 MIU/L-ACNC: 2.46 MU/L (ref 0.4–4)

## 2022-10-10 LAB
HCV AB SERPL QL IA: NONREACTIVE
HIV 1+2 AB+HIV1 P24 AG SERPL QL IA: NONREACTIVE
SHBG SERPL-SCNC: 30 NMOL/L (ref 11–80)

## 2022-10-11 LAB
TESTOST FREE SERPL-MCNC: 18.06 NG/DL
TESTOST SERPL-MCNC: 756 NG/DL (ref 240–950)

## 2022-11-27 DIAGNOSIS — E78.00 HYPERCHOLESTEREMIA: ICD-10-CM

## 2022-11-28 DIAGNOSIS — R79.89 LOW TESTOSTERONE: ICD-10-CM

## 2022-11-28 RX ORDER — TESTOSTERONE 1.62 MG/G
GEL TRANSDERMAL
Qty: 150 G | Refills: 0 | Status: SHIPPED | OUTPATIENT
Start: 2022-11-28 | End: 2023-01-09

## 2022-11-29 RX ORDER — SIMVASTATIN 20 MG
TABLET ORAL
Qty: 90 TABLET | Refills: 2 | Status: SHIPPED | OUTPATIENT
Start: 2022-11-29 | End: 2023-08-07

## 2022-11-29 NOTE — TELEPHONE ENCOUNTER
Prescription approved per [facility] Refill Protocol.  Tanisha THACKER RN, BSN

## 2022-12-02 DIAGNOSIS — R79.89 LOW TESTOSTERONE: Primary | ICD-10-CM

## 2022-12-02 RX ORDER — TESTOSTERONE GEL, 1% 10 MG/G
50 GEL TRANSDERMAL DAILY
Qty: 5 G | Refills: 0 | Status: SHIPPED | OUTPATIENT
Start: 2022-12-02 | End: 2022-12-06 | Stop reason: ALTCHOICE

## 2022-12-05 ENCOUNTER — TELEPHONE (OUTPATIENT)
Dept: INTERNAL MEDICINE | Facility: CLINIC | Age: 57
End: 2022-12-05

## 2022-12-05 DIAGNOSIS — R79.89 LOW TESTOSTERONE: ICD-10-CM

## 2022-12-05 NOTE — TELEPHONE ENCOUNTER
Patient calling  He was sent testosterone (ANDROGEL/TESTIM) 50 MG/5GM (1%) topical gel but he wants to continue with   testosterone (ANDROGEL 1.62 % PUMP) 20.25 MG/ACT gel  Patient not sure why there was a change. Please advise. Ok to call and juan [phone]

## 2022-12-06 ENCOUNTER — TELEPHONE (OUTPATIENT)
Dept: INTERNAL MEDICINE | Facility: CLINIC | Age: 57
End: 2022-12-06

## 2022-12-06 DIAGNOSIS — N52.9 ERECTILE DYSFUNCTION, UNSPECIFIED ERECTILE DYSFUNCTION TYPE: ICD-10-CM

## 2022-12-06 DIAGNOSIS — R35.1 BENIGN PROSTATIC HYPERPLASIA WITH NOCTURIA: Primary | ICD-10-CM

## 2022-12-06 DIAGNOSIS — N40.1 BENIGN PROSTATIC HYPERPLASIA WITH NOCTURIA: Primary | ICD-10-CM

## 2022-12-06 DIAGNOSIS — R79.89 LOW TESTOSTERONE: ICD-10-CM

## 2022-12-06 RX ORDER — TESTOSTERONE 1.62 MG/G
GEL TRANSDERMAL
Qty: 150 G | Refills: 0 | Status: CANCELLED | OUTPATIENT
Start: 2022-12-06

## 2022-12-06 NOTE — TELEPHONE ENCOUNTER
Patient calling  He has had side affects from the Flomax and wants to go back on tadalafil (CIALIS) 5 MG tablet   Please advise  Ok to call and  [phone]

## 2022-12-06 NOTE — TELEPHONE ENCOUNTER
Patient called his insurance  testosterone (ANDROGEL 1.62 % PUMP) 20.25 MG/ACT gel was out of stock but is in stock now. He needs a new rx   Please advise  Ok to call and juan [phone]

## 2022-12-06 NOTE — TELEPHONE ENCOUNTER
Call to pharmacy. Verified testosterone (ANDROGEL 1.62 % PUMP) 20.25 MG/ACT gel was out of stock but is now in stock. Pharmacist ran prescription through insurance. No new prescription is needed.

## 2022-12-07 RX ORDER — TADALAFIL 5 MG/1
5 TABLET ORAL EVERY 24 HOURS
Qty: 90 TABLET | Refills: 3 | Status: SHIPPED | OUTPATIENT
Start: 2022-12-07 | End: 2023-12-18

## 2022-12-07 NOTE — TELEPHONE ENCOUNTER
"Patient states Flomax side effects have included nausea, slight headache, fatigue \"all the time\" and low sex drive. He researched and found that all of the symptoms he is experiencing can be from Flomax.  He wants to go back on Cialis 5 mg daily, [facility] to [facility].  "

## 2022-12-07 NOTE — TELEPHONE ENCOUNTER
Patient asks that clinic staff contact [facility] and see what the hold up is with getting his Testosterone shipped.     At one point they did not have the pump available but had packets.  Bottom line is he doesn't care what form, he just needs this medication.  It has been held up for weeks.  If they still don't have the pump then MD will need to send an rx for the form of delivery they do have in stock.  PUSHPA Christine R.N.

## 2022-12-08 NOTE — TELEPHONE ENCOUNTER
See the Tele message from 12/5. Another RN called pharmacy on 12/5,  and they were out of stock on the Jeramy gel, but did get it in, and ran through insurance so were going to get ready to refill, then mail out.     Pt advised. He agrees with this.

## 2022-12-08 NOTE — TELEPHONE ENCOUNTER
Routing refill request to provider for review/approval because:  Drug not on the [facility] refill protocol     Tricia Benitez RN

## 2022-12-10 ENCOUNTER — TRANSFERRED RECORDS (OUTPATIENT)
Dept: HEALTH INFORMATION MANAGEMENT | Facility: CLINIC | Age: 57
End: 2022-12-10

## 2022-12-10 ENCOUNTER — HOSPITAL ENCOUNTER (EMERGENCY)
Facility: CLINIC | Age: 57
Discharge: HOME | End: 2022-12-10
Attending: EMERGENCY MEDICINE | Admitting: EMERGENCY MEDICINE
Payer: COMMERCIAL

## 2022-12-10 ENCOUNTER — APPOINTMENT (OUTPATIENT)
Dept: GENERAL RADIOLOGY | Facility: CLINIC | Age: 57
End: 2022-12-10
Attending: EMERGENCY MEDICINE
Payer: COMMERCIAL

## 2022-12-10 VITALS
HEART RATE: 55 BPM | RESPIRATION RATE: 17 BRPM | OXYGEN SATURATION: 100 % | SYSTOLIC BLOOD PRESSURE: 141 MMHG | DIASTOLIC BLOOD PRESSURE: 83 MMHG

## 2022-12-10 DIAGNOSIS — R07.9 CHEST PAIN, UNSPECIFIED TYPE: ICD-10-CM

## 2022-12-10 LAB
ALBUMIN SERPL BCG-MCNC: 4.6 G/DL (ref 3.5–5.2)
ALP SERPL-CCNC: 72 U/L (ref 40–129)
ALT SERPL W P-5'-P-CCNC: 21 U/L (ref 10–50)
ANION GAP SERPL CALCULATED.3IONS-SCNC: 12 MMOL/L (ref 7–15)
AST SERPL W P-5'-P-CCNC: 37 U/L (ref 10–50)
BASOPHILS # BLD AUTO: 0 10E3/UL (ref 0–0.2)
BASOPHILS NFR BLD AUTO: 0 %
BILIRUB SERPL-MCNC: 0.5 MG/DL
BUN SERPL-MCNC: 19.4 MG/DL (ref 8–23)
CALCIUM SERPL-MCNC: 9.5 MG/DL (ref 8.6–10)
CHLORIDE SERPL-SCNC: 101 MMOL/L (ref 98–107)
CREAT SERPL-MCNC: 1.04 MG/DL (ref 0.67–1.17)
DEPRECATED HCO3 PLAS-SCNC: 26 MMOL/L (ref 22–29)
EOSINOPHIL # BLD AUTO: 0.1 10E3/UL (ref 0–0.7)
EOSINOPHIL NFR BLD AUTO: 2 %
ERYTHROCYTE [DISTWIDTH] IN BLOOD BY AUTOMATED COUNT: 12.8 % (ref 10–15)
GFR SERPL CREATININE-BSD FRML MDRD: 83 ML/MIN/1.73M2
GLUCOSE SERPL-MCNC: 107 MG/DL (ref 70–99)
HCT VFR BLD AUTO: 45 % (ref 40–53)
HGB BLD-MCNC: 14.8 G/DL (ref 13.3–17.7)
HOLD SPECIMEN: NORMAL
HOLD SPECIMEN: NORMAL
IMM GRANULOCYTES # BLD: 0 10E3/UL
IMM GRANULOCYTES NFR BLD: 0 %
LYMPHOCYTES # BLD AUTO: 1.7 10E3/UL (ref 0.8–5.3)
LYMPHOCYTES NFR BLD AUTO: 29 %
MCH RBC QN AUTO: 30.8 PG (ref 26.5–33)
MCHC RBC AUTO-ENTMCNC: 32.9 G/DL (ref 31.5–36.5)
MCV RBC AUTO: 94 FL (ref 78–100)
MONOCYTES # BLD AUTO: 0.4 10E3/UL (ref 0–1.3)
MONOCYTES NFR BLD AUTO: 7 %
NEUTROPHILS # BLD AUTO: 3.6 10E3/UL (ref 1.6–8.3)
NEUTROPHILS NFR BLD AUTO: 62 %
NRBC # BLD AUTO: 0 10E3/UL
NRBC BLD AUTO-RTO: 0 /100
PLATELET # BLD AUTO: 198 10E3/UL (ref 150–450)
POTASSIUM SERPL-SCNC: 4.8 MMOL/L (ref 3.4–5.3)
PROT SERPL-MCNC: 7.1 G/DL (ref 6.4–8.3)
RBC # BLD AUTO: 4.81 10E6/UL (ref 4.4–5.9)
SODIUM SERPL-SCNC: 139 MMOL/L (ref 136–145)
TROPONIN T SERPL HS-MCNC: 10 NG/L
TROPONIN T SERPL HS-MCNC: 11 NG/L
WBC # BLD AUTO: 5.9 10E3/UL (ref 4–11)

## 2022-12-10 PROCEDURE — 80053 COMPREHEN METABOLIC PANEL: CPT | Performed by: EMERGENCY MEDICINE

## 2022-12-10 PROCEDURE — 99285 EMERGENCY DEPT VISIT HI MDM: CPT | Mod: 25

## 2022-12-10 PROCEDURE — 36415 COLL VENOUS BLD VENIPUNCTURE: CPT | Performed by: EMERGENCY MEDICINE

## 2022-12-10 PROCEDURE — 93005 ELECTROCARDIOGRAM TRACING: CPT

## 2022-12-10 PROCEDURE — 84484 ASSAY OF TROPONIN QUANT: CPT | Performed by: EMERGENCY MEDICINE

## 2022-12-10 PROCEDURE — 85025 COMPLETE CBC W/AUTO DIFF WBC: CPT | Performed by: EMERGENCY MEDICINE

## 2022-12-10 PROCEDURE — 71046 X-RAY EXAM CHEST 2 VIEWS: CPT

## 2022-12-10 ASSESSMENT — ENCOUNTER SYMPTOMS
ABDOMINAL DISTENTION: 0
COUGH: 0
CHEST TIGHTNESS: 1
FEVER: 0
SHORTNESS OF BREATH: 1
DIAPHORESIS: 0

## 2022-12-10 ASSESSMENT — ACTIVITIES OF DAILY LIVING (ADL): ADLS_ACUITY_SCORE: 35

## 2022-12-10 NOTE — ED TRIAGE NOTES
Pt arrives via EMS w/ muscle tightness that started yesterday. Pt denies any trauma. Tightness continued today. Pt was at [facility] and was sent here. Hypertensive 170s - not normal for him. 324 aspirin given.

## 2022-12-10 NOTE — ED PROVIDER NOTES
"  History   Chief Complaint:  Chest Pain       The history is provided by the patient.      Anant Mena is a 57 year old male with history of hyperlipidemia and GERD who presents via EMS from a  in [place] with anterior chest pain and tightness, and concerns of EKG changes found at the . He reports having chest pain since yesterday that is exacerbated by movement. This morning, he had worsened pain while shoveling snow with associated shortness of breath, prompting his arrival at the . There, his EKG detected ST changes and he was found to be hypertensive at 170s systolic. He was given Aspirin (324 mg) before being transferred to the ED. He reports his pain \"feels like a pulled muscle\" and he denies any relief of pain with rest. He denies any history of heart attack or other cardiac problems. He is a nonsmoker. He denies diaphoresis, abdominal pain, cough, and fever.    Review of Systems   Constitutional: Negative for diaphoresis and fever.   Respiratory: Positive for chest tightness (right anterior) and shortness of breath. Negative for cough.    Cardiovascular: Positive for chest pain.   Gastrointestinal: Negative for abdominal distention.   All other systems reviewed and are negative.        Allergies:  No Known Allergies    Medications:  Finasteride  Sildenafil   Simvastatin  Tadalafil   Testosterone     Past Medical History:     Hyperlipidemia  GERD  Lumbar radiculopathy  Anxiety  Low testosterone      Past Surgical History:    Colonoscopy  EGD  Meniscus repair, left     Family History:    Osteoporosis    Hypertension  Alzheimer disease  Diabetes  Breast cancer  Asthma     Social History:  The patient presents via EMS  The patient presents alone   PCP: Elgin Patel MD    Physical Exam     Patient Vitals for the past 24 hrs:   BP Pulse Resp SpO2   12/10/22 1114 (!) 174/96 64 17 100 %       Physical Exam  Vitals: reviewed by me  General: Pt seen on hospital sp, pleasant, cooperative, and " alert to conversation  Eyes: Tracking well, clear conjunctiva BL  ENT: MMM, midline trachea.   Lungs: No tachypnea, no accessory muscle use. No respiratory distress.  Lungs are clear to auscultation bilaterally  CV: Rate as above, normal S1-S2, no additional heart sounds heard.  Abd: Soft, non tender, no guarding, no rebound. Non distended  MSK: no joint effusion.  No evidence of trauma  Skin: No rash  Neuro: Clear speech and no facial droop.  Psych: Not RIS, no e/o AH/VH      Emergency Department Course   ECG  ECG taken at 1157, ECG read at 1201  Sinus bradycardia.  Otherwise normal ECG.  Rate 55 bpm. UT interval 190 ms. QRS duration 94 ms. QT/QTc 428/409 ms. P-R-T axis 63 51 37.     Imaging:  XR Chest 2 Views   Final Result   IMPRESSION: Negative chest. Lungs clear.        Report per radiology    Laboratory:  Labs Ordered and Resulted from Time of ED Arrival to Time of ED Departure   COMPREHENSIVE METABOLIC PANEL - Abnormal       Result Value    Sodium 139      Potassium 4.8      Chloride 101      Carbon Dioxide (CO2) 26      Anion Gap 12      Urea Nitrogen 19.4      Creatinine 1.04      Calcium 9.5      Glucose 107 (*)     Alkaline Phosphatase 72      AST 37      ALT 21      Protein Total 7.1      Albumin 4.6      Bilirubin Total 0.5      GFR Estimate 83     TROPONIN T, HIGH SENSITIVITY - Normal    Troponin T, High Sensitivity 11     TROPONIN T, HIGH SENSITIVITY - Normal    Troponin T, High Sensitivity 10     CBC WITH PLATELETS AND DIFFERENTIAL    WBC Count 5.9      RBC Count 4.81      Hemoglobin 14.8      Hematocrit 45.0      MCV 94      MCH 30.8      MCHC 32.9      RDW 12.8      Platelet Count 198      % Neutrophils 62      % Lymphocytes 29      % Monocytes 7      % Eosinophils 2      % Basophils 0      % Immature Granulocytes 0      NRBCs per 100 WBC 0      Absolute Neutrophils 3.6      Absolute Lymphocytes 1.7      Absolute Monocytes 0.4      Absolute Eosinophils 0.1      Absolute Basophils 0.0      Absolute  Immature Granulocytes 0.0      Absolute NRBCs 0.0          Emergency Department Course:    Reviewed:  I reviewed nursing notes, vitals, past medical history and Care Everywhere    Assessments:  1114 I obtained history and examined the patient as noted above.   1349 I rechecked the patient and explained findings.    Disposition:  The patient was discharged to home.     Impression & Plan     Medical Decision Making:  This is a very pleasant 57-year-old male who presents the emergency room with what appears to be chest pain.  It did start while he was shoveling snow, but he also notes that is worse with any kind of movement, and he believes it to be musculoskeletal.  Thankfully, his troponins are negative x2, and I do not see any tachycardia, hypoxia or pleurisy to evidence of pulmonary embolism.  His x-ray shows no evidence of pneumothorax, and I do not think that this is a dissection.  He is doing very well here, his EKG is reassuring, he did receive an aspirin out of an abundance of caution, and he is pain-free throughout his time here in the ER I will add.  I do think that he stable for outpatient management, he certainly does seem stable to see his regular doctor in the next 1 week for a cardiac stress test and additional care, per the heart score he is low risk.  Patient feels comfortable this plan, knows that no clear cause of his chest pain was found, will plan for discharge as above.    Diagnosis:    ICD-10-CM    1. Chest pain, unspecified type  R07.9           Scribe Disclosure:  STEFANY Shanekamonika Fuentes, am serving as a scribe at 11:12 AM on 12/10/2022 to document services personally performed by Jeromy Bee MD based on my observations and the provider's statements to me.          Jeromy Bee MD  12/10/22 [id number]

## 2022-12-10 NOTE — DISCHARGE INSTRUCTIONS
As we discussed, no clear cause of your chest pain was found today, although it may be musculoskeletal, we are unable to say for certain.  We do know that your cardiac enzymes are very reassuring, your x-ray is also reassuring, and your EKG does not show any sign of a heart attack.  Please come back to the ER immediately with any other concerns you have and be certain that you see your regular doctor in the next 1 week as you may need a cardiac stress test and certainly will need an additional follow-up exam.  Come back with any other concerns and to be certain to see your regular doctor next week as we discussed.

## 2022-12-12 LAB
ATRIAL RATE - MUSE: 55 BPM
DIASTOLIC BLOOD PRESSURE - MUSE: NORMAL MMHG
INTERPRETATION ECG - MUSE: NORMAL
P AXIS - MUSE: 63 DEGREES
PR INTERVAL - MUSE: 190 MS
QRS DURATION - MUSE: 94 MS
QT - MUSE: 428 MS
QTC - MUSE: 409 MS
R AXIS - MUSE: 51 DEGREES
SYSTOLIC BLOOD PRESSURE - MUSE: NORMAL MMHG
T AXIS - MUSE: 37 DEGREES
VENTRICULAR RATE- MUSE: 55 BPM

## 2023-01-09 DIAGNOSIS — R79.89 LOW TESTOSTERONE: ICD-10-CM

## 2023-01-09 RX ORDER — TESTOSTERONE 1.62 MG/G
GEL TRANSDERMAL
Qty: 150 G | Refills: 0 | Status: SHIPPED | OUTPATIENT
Start: 2023-01-09 | End: 2023-01-30

## 2023-01-29 DIAGNOSIS — R79.89 LOW TESTOSTERONE: ICD-10-CM

## 2023-01-30 RX ORDER — TESTOSTERONE 1.62 MG/G
GEL TRANSDERMAL
Qty: 150 G | Refills: 0 | Status: SHIPPED | OUTPATIENT
Start: 2023-01-30 | End: 2023-03-08

## 2023-03-08 DIAGNOSIS — R79.89 LOW TESTOSTERONE: ICD-10-CM

## 2023-03-08 RX ORDER — TESTOSTERONE 1.62 MG/G
GEL TRANSDERMAL
Qty: 150 G | Refills: 0 | Status: SHIPPED | OUTPATIENT
Start: 2023-03-08 | End: 2023-04-04

## 2023-04-04 DIAGNOSIS — R79.89 LOW TESTOSTERONE: ICD-10-CM

## 2023-04-04 RX ORDER — TESTOSTERONE 1.62 MG/G
GEL TRANSDERMAL
Qty: 150 G | Refills: 0 | Status: SHIPPED | OUTPATIENT
Start: 2023-04-04 | End: 2023-04-27

## 2023-04-27 DIAGNOSIS — R79.89 LOW TESTOSTERONE: ICD-10-CM

## 2023-04-27 RX ORDER — TESTOSTERONE 1.62 MG/G
GEL TRANSDERMAL
Qty: 150 G | Refills: 0 | Status: SHIPPED | OUTPATIENT
Start: 2023-04-27 | End: 2023-06-26

## 2023-06-26 DIAGNOSIS — R79.89 LOW TESTOSTERONE: ICD-10-CM

## 2023-06-26 RX ORDER — TESTOSTERONE 1.62 MG/G
GEL TRANSDERMAL
Qty: 150 G | Refills: 0 | Status: SHIPPED | OUTPATIENT
Start: 2023-06-26 | End: 2023-08-28

## 2023-08-05 DIAGNOSIS — E78.00 HYPERCHOLESTEREMIA: ICD-10-CM

## 2023-08-07 RX ORDER — SIMVASTATIN 20 MG
TABLET ORAL
Qty: 90 TABLET | Refills: 0 | Status: SHIPPED | OUTPATIENT
Start: 2023-08-07 | End: 2023-10-03

## 2023-08-28 DIAGNOSIS — R79.89 LOW TESTOSTERONE: ICD-10-CM

## 2023-08-28 RX ORDER — TESTOSTERONE 20.25 MG/1.25G
GEL TOPICAL
Qty: 150 G | Refills: 0 | Status: SHIPPED | OUTPATIENT
Start: 2023-08-28 | End: 2023-10-03

## 2023-10-02 DIAGNOSIS — E78.00 HYPERCHOLESTEREMIA: ICD-10-CM

## 2023-10-02 DIAGNOSIS — N40.1 BENIGN PROSTATIC HYPERPLASIA WITH URINARY HESITANCY: ICD-10-CM

## 2023-10-02 DIAGNOSIS — R39.11 BENIGN PROSTATIC HYPERPLASIA WITH URINARY HESITANCY: ICD-10-CM

## 2023-10-02 DIAGNOSIS — R79.89 LOW TESTOSTERONE: ICD-10-CM

## 2023-10-03 RX ORDER — SIMVASTATIN 20 MG
TABLET ORAL
Qty: 90 TABLET | Refills: 3 | Status: SHIPPED | OUTPATIENT
Start: 2023-10-03 | End: 2024-09-26

## 2023-10-03 RX ORDER — FINASTERIDE 5 MG/1
TABLET, FILM COATED ORAL
Qty: 90 TABLET | Refills: 3 | Status: SHIPPED | OUTPATIENT
Start: 2023-10-03 | End: 2024-09-26

## 2023-10-03 RX ORDER — TESTOSTERONE 20.25 MG/1.25G
GEL TOPICAL
Qty: 150 G | Refills: 0 | Status: SHIPPED | OUTPATIENT
Start: 2023-10-03 | End: 2023-11-07

## 2023-10-10 ASSESSMENT — ENCOUNTER SYMPTOMS
PALPITATIONS: 0
ARTHRALGIAS: 0
DIZZINESS: 0
CHILLS: 0
SHORTNESS OF BREATH: 0
NERVOUS/ANXIOUS: 0
EYE PAIN: 0
PARESTHESIAS: 0
COUGH: 0
MYALGIAS: 0
ABDOMINAL PAIN: 0
DYSURIA: 0
HEMATOCHEZIA: 0
FREQUENCY: 0
FEVER: 0
SORE THROAT: 0
HEMATURIA: 0
DIARRHEA: 0
WEAKNESS: 0
JOINT SWELLING: 0
CONSTIPATION: 0
HEARTBURN: 0
HEADACHES: 0
NAUSEA: 0

## 2023-10-17 ENCOUNTER — OFFICE VISIT (OUTPATIENT)
Dept: INTERNAL MEDICINE | Facility: CLINIC | Age: 58
End: 2023-10-17
Payer: COMMERCIAL

## 2023-10-17 VITALS
SYSTOLIC BLOOD PRESSURE: 135 MMHG | HEIGHT: 68 IN | RESPIRATION RATE: 16 BRPM | BODY MASS INDEX: 25.39 KG/M2 | HEART RATE: 63 BPM | DIASTOLIC BLOOD PRESSURE: 81 MMHG | TEMPERATURE: 96.9 F | OXYGEN SATURATION: 100 % | WEIGHT: 167.5 LBS

## 2023-10-17 DIAGNOSIS — E78.00 HYPERCHOLESTEREMIA: ICD-10-CM

## 2023-10-17 DIAGNOSIS — N52.9 ERECTILE DYSFUNCTION, UNSPECIFIED ERECTILE DYSFUNCTION TYPE: ICD-10-CM

## 2023-10-17 DIAGNOSIS — E78.5 HYPERLIPIDEMIA LDL GOAL <130: ICD-10-CM

## 2023-10-17 DIAGNOSIS — R79.89 ELEVATED SERUM CREATININE: ICD-10-CM

## 2023-10-17 DIAGNOSIS — Z23 NEED FOR PROPHYLACTIC VACCINATION AND INOCULATION AGAINST INFLUENZA: ICD-10-CM

## 2023-10-17 DIAGNOSIS — R79.89 LOW TESTOSTERONE: ICD-10-CM

## 2023-10-17 DIAGNOSIS — N40.0 BENIGN PROSTATIC HYPERPLASIA WITHOUT LOWER URINARY TRACT SYMPTOMS: ICD-10-CM

## 2023-10-17 DIAGNOSIS — Z00.00 ENCOUNTER FOR PREVENTATIVE ADULT HEALTH CARE EXAMINATION: Primary | ICD-10-CM

## 2023-10-17 LAB
ALBUMIN SERPL BCG-MCNC: 5.1 G/DL (ref 3.5–5.2)
ALBUMIN UR-MCNC: NEGATIVE MG/DL
ALP SERPL-CCNC: 76 U/L (ref 40–129)
ALT SERPL W P-5'-P-CCNC: 28 U/L (ref 0–70)
ANION GAP SERPL CALCULATED.3IONS-SCNC: 10 MMOL/L (ref 7–15)
APPEARANCE UR: CLEAR
AST SERPL W P-5'-P-CCNC: 35 U/L (ref 0–45)
BACTERIA #/AREA URNS HPF: NORMAL /HPF
BILIRUB SERPL-MCNC: 0.6 MG/DL
BILIRUB UR QL STRIP: NEGATIVE
BUN SERPL-MCNC: 17.6 MG/DL (ref 8–23)
CALCIUM SERPL-MCNC: 9.9 MG/DL (ref 8.8–10.2)
CHLORIDE SERPL-SCNC: 102 MMOL/L (ref 98–107)
CHOLEST SERPL-MCNC: 232 MG/DL
COLOR UR AUTO: YELLOW
CREAT SERPL-MCNC: 1.24 MG/DL (ref 0.67–1.17)
DEPRECATED HCO3 PLAS-SCNC: 27 MMOL/L (ref 22–29)
EGFRCR SERPLBLD CKD-EPI 2021: 67 ML/MIN/1.73M2
ERYTHROCYTE [DISTWIDTH] IN BLOOD BY AUTOMATED COUNT: 12.7 % (ref 10–15)
GLUCOSE SERPL-MCNC: 97 MG/DL (ref 70–99)
GLUCOSE UR STRIP-MCNC: NEGATIVE MG/DL
HCT VFR BLD AUTO: 47.2 % (ref 40–53)
HDLC SERPL-MCNC: 64 MG/DL
HGB BLD-MCNC: 16 G/DL (ref 13.3–17.7)
HGB UR QL STRIP: NEGATIVE
KETONES UR STRIP-MCNC: NEGATIVE MG/DL
LDLC SERPL CALC-MCNC: 140 MG/DL
LEUKOCYTE ESTERASE UR QL STRIP: NEGATIVE
MCH RBC QN AUTO: 30.1 PG (ref 26.5–33)
MCHC RBC AUTO-ENTMCNC: 33.9 G/DL (ref 31.5–36.5)
MCV RBC AUTO: 89 FL (ref 78–100)
NITRATE UR QL: NEGATIVE
NONHDLC SERPL-MCNC: 168 MG/DL
PH UR STRIP: 7 [PH] (ref 5–7)
PLATELET # BLD AUTO: 208 10E3/UL (ref 150–450)
POTASSIUM SERPL-SCNC: 4.4 MMOL/L (ref 3.4–5.3)
PROT SERPL-MCNC: 7.8 G/DL (ref 6.4–8.3)
PSA SERPL DL<=0.01 NG/ML-MCNC: 0.71 NG/ML (ref 0–4.5)
RBC # BLD AUTO: 5.31 10E6/UL (ref 4.4–5.9)
RBC #/AREA URNS AUTO: NORMAL /HPF
SODIUM SERPL-SCNC: 139 MMOL/L (ref 135–145)
SP GR UR STRIP: 1.02 (ref 1–1.03)
TRIGL SERPL-MCNC: 138 MG/DL
TSH SERPL DL<=0.005 MIU/L-ACNC: 2.69 UIU/ML (ref 0.3–4.2)
UROBILINOGEN UR STRIP-ACNC: 0.2 E.U./DL
WBC # BLD AUTO: 4.5 10E3/UL (ref 4–11)
WBC #/AREA URNS AUTO: NORMAL /HPF

## 2023-10-17 PROCEDURE — 85027 COMPLETE CBC AUTOMATED: CPT | Performed by: INTERNAL MEDICINE

## 2023-10-17 PROCEDURE — 99396 PREV VISIT EST AGE 40-64: CPT | Mod: 25 | Performed by: INTERNAL MEDICINE

## 2023-10-17 PROCEDURE — G0103 PSA SCREENING: HCPCS | Performed by: INTERNAL MEDICINE

## 2023-10-17 PROCEDURE — 80061 LIPID PANEL: CPT | Performed by: INTERNAL MEDICINE

## 2023-10-17 PROCEDURE — 36415 COLL VENOUS BLD VENIPUNCTURE: CPT | Performed by: INTERNAL MEDICINE

## 2023-10-17 PROCEDURE — 81001 URINALYSIS AUTO W/SCOPE: CPT | Performed by: INTERNAL MEDICINE

## 2023-10-17 PROCEDURE — 84443 ASSAY THYROID STIM HORMONE: CPT | Performed by: INTERNAL MEDICINE

## 2023-10-17 PROCEDURE — 80053 COMPREHEN METABOLIC PANEL: CPT | Performed by: INTERNAL MEDICINE

## 2023-10-17 PROCEDURE — 90682 RIV4 VACC RECOMBINANT DNA IM: CPT | Performed by: INTERNAL MEDICINE

## 2023-10-17 PROCEDURE — 99214 OFFICE O/P EST MOD 30 MIN: CPT | Mod: 25 | Performed by: INTERNAL MEDICINE

## 2023-10-17 PROCEDURE — 84403 ASSAY OF TOTAL TESTOSTERONE: CPT | Performed by: INTERNAL MEDICINE

## 2023-10-17 PROCEDURE — 90471 IMMUNIZATION ADMIN: CPT | Performed by: INTERNAL MEDICINE

## 2023-10-17 RX ORDER — SILDENAFIL 100 MG/1
TABLET, FILM COATED ORAL
Qty: 13 TABLET | Refills: 11 | Status: SHIPPED | OUTPATIENT
Start: 2023-10-17

## 2023-10-17 ASSESSMENT — ENCOUNTER SYMPTOMS
DIZZINESS: 0
HEARTBURN: 0
PALPITATIONS: 0
HEMATURIA: 0
EYE PAIN: 0
PARESTHESIAS: 0
SORE THROAT: 0
CONSTIPATION: 0
WEAKNESS: 0
NAUSEA: 0
DYSURIA: 0
FEVER: 0
COUGH: 0
CHILLS: 0
ABDOMINAL PAIN: 0
HEMATOCHEZIA: 0
FREQUENCY: 0
ARTHRALGIAS: 0
MYALGIAS: 0
HEADACHES: 0
JOINT SWELLING: 0
NERVOUS/ANXIOUS: 0
SHORTNESS OF BREATH: 0
DIARRHEA: 0

## 2023-10-17 ASSESSMENT — PAIN SCALES - GENERAL: PAINLEVEL: NO PAIN (0)

## 2023-10-17 NOTE — PROGRESS NOTES
SUBJECTIVE:   CC: Anant is an 58 year old who presents for preventative health visit.       10/17/2023     8:19 AM   Additional Questions   Roomed by Naz GATICA   Accompanied by n/a       Healthy Habits:     Getting at least 3 servings of Calcium per day:  NO    Bi-annual eye exam:  Yes    Dental care twice a year:  Yes    Sleep apnea or symptoms of sleep apnea:  Daytime drowsiness    Diet:  Regular (no restrictions)    Frequency of exercise:  2-3 days/week    Duration of exercise:  15-30 minutes    Taking medications regularly:  Yes    Medication side effects:  Not applicable    Additional concerns today:  No      Today's PHQ-2 Score:       10/17/2023     8:02 AM   PHQ-2 ( 1999 [employer])   Q1: Little interest or pleasure in doing things 0   Q2: Feeling down, depressed or hopeless 0   PHQ-2 Score 0   Q1: Little interest or pleasure in doing things Not at all   Q2: Feeling down, depressed or hopeless Not at all   PHQ-2 Score 0                 PROBLEMS TO ADD ON...  Has H/O hyperlipidemia. On medical treatment and diet. No side effects. No muscle weakness, myalgias or upset stomach.   Has H/O BPH. On treatment with Proscar . No  symptoms of frequency, decreased urinary stream or dysuria. No side effects from medications.  Has h/o low testosterone, on topical treatment, levels are in normal range.   Has h/o ED. On PRN Viagra, helps with symptoms.       Social History     Tobacco Use    Smoking status: Never    Smokeless tobacco: Never   Substance Use Topics    Alcohol use: Yes     Comment: ocas             10/10/2023     2:49 PM   Alcohol Use   Prescreen: >3 drinks/day or >7 drinks/week? No       Last PSA:   PSA   Date Value Ref Range Status   10/28/2020 1.00 0 - 4 ug/L Final     Comment:     Assay Method:  Chemiluminescence using Siemens Vista analyzer     Prostate Specific Antigen Screen   Date Value Ref Range Status   10/07/2022 1.52 0.00 - 4.00 ug/L Final       Reviewed orders with patient. Reviewed health maintenance  "and updated orders accordingly - Yes  Lab work is in process  Labs reviewed in EPIC    Reviewed and updated as needed this visit by clinical staff   Tobacco  Allergies  Meds  Problems  Med Hx  Surg Hx  Fam Hx          Reviewed and updated as needed this visit by Provider                     Review of Systems   Constitutional:  Negative for chills and fever.   HENT:  Negative for congestion, ear pain, hearing loss and sore throat.    Eyes:  Negative for pain and visual disturbance.   Respiratory:  Negative for cough and shortness of breath.    Cardiovascular:  Negative for chest pain, palpitations and peripheral edema.   Gastrointestinal:  Negative for abdominal pain, constipation, diarrhea, heartburn, hematochezia and nausea.   Genitourinary:  Negative for dysuria, frequency, genital sores, hematuria, impotence, penile discharge and urgency.   Musculoskeletal:  Negative for arthralgias, joint swelling and myalgias.   Skin:  Negative for rash.   Neurological:  Negative for dizziness, weakness, headaches and paresthesias.   Psychiatric/Behavioral:  Negative for mood changes. The patient is not nervous/anxious.          OBJECTIVE:   /81 (BP Location: Left arm, Cuff Size: Adult Regular)   Pulse 63   Temp 96.9  F (36.1  C) (Tympanic)   Resp 16   Ht 1.734 m (5' 8.25\")   Wt 76 kg (167 lb 8 oz)   SpO2 100%   BMI 25.28 kg/m      Physical Exam  GENERAL: healthy, alert and no distress  EYES: Eyes grossly normal to inspection, PERRL and conjunctivae and sclerae normal  HENT: ear canals and TM's normal, nose and mouth without ulcers or lesions  NECK: no adenopathy, no asymmetry, masses, or scars and thyroid normal to palpation  RESP: lungs clear to auscultation - no rales, rhonchi or wheezes  CV: regular rate and rhythm, normal S1 S2, no S3 or S4, no murmur, click or rub, no peripheral edema and peripheral pulses strong  ABDOMEN: soft, nontender, no hepatosplenomegaly, no masses and bowel sounds normal  MS: " no gross musculoskeletal defects noted, no edema  SKIN: no suspicious lesions or rashes  NEURO: Normal strength and tone, mentation intact and speech normal  PSYCH: mentation appears normal, affect normal/bright    Diagnostic Test Results:  Labs reviewed in Epic    ASSESSMENT/PLAN:       ICD-10-CM    1. Encounter for preventative adult health care examination  Z00.00 Lipid panel reflex to direct LDL Fasting     CBC with platelets     Comprehensive metabolic panel (BMP + Alb, Alk Phos, ALT, AST, Total. Bili, TP)     PSA, screen     TSH with free T4 reflex     UA with Microscopic reflex to Culture - lab collect      2. Hypercholesteremia  E78.00 sildenafil (VIAGRA) 100 MG tablet      3. Low testosterone  R79.89 Testosterone, total     OFFICE/OUTPT VISIT,EST,LEVL III      4. HYPERLIPIDEMIA LDL GOAL <130  E78.5 OFFICE/OUTPT VISIT,EST,LEVL III      5. Benign prostatic hyperplasia without lower urinary tract symptoms  N40.0 OFFICE/OUTPT VISIT,EST,LEVL III      6. Erectile dysfunction, unspecified erectile dysfunction type  N52.9         Assess lab work   Continue treatment , refilled prescriptions       Patient has been advised of split billing requirements and indicates understanding: Yes      COUNSELING:   Reviewed preventive health counseling, as reflected in patient instructions       Regular exercise       Healthy diet/nutrition       Vision screening       Hearing screening       Colorectal cancer screening       Prostate cancer screening        He reports that he has never smoked. He has never used smokeless tobacco.            Elgin Patel MD  [facility]

## 2023-10-22 LAB — TESTOST SERPL-MCNC: 781 NG/DL (ref 240–950)

## 2023-11-07 DIAGNOSIS — R79.89 LOW TESTOSTERONE: ICD-10-CM

## 2023-11-07 RX ORDER — TESTOSTERONE 20.25 MG/1.25G
GEL TOPICAL
Qty: 150 G | Refills: 0 | Status: SHIPPED | OUTPATIENT
Start: 2023-11-07 | End: 2023-12-18

## 2023-12-17 DIAGNOSIS — R35.1 BENIGN PROSTATIC HYPERPLASIA WITH NOCTURIA: ICD-10-CM

## 2023-12-17 DIAGNOSIS — N40.1 BENIGN PROSTATIC HYPERPLASIA WITH NOCTURIA: ICD-10-CM

## 2023-12-17 DIAGNOSIS — N52.9 ERECTILE DYSFUNCTION, UNSPECIFIED ERECTILE DYSFUNCTION TYPE: ICD-10-CM

## 2023-12-17 DIAGNOSIS — R79.89 LOW TESTOSTERONE: ICD-10-CM

## 2023-12-18 RX ORDER — TADALAFIL 5 MG/1
5 TABLET ORAL DAILY
Qty: 90 TABLET | Refills: 3 | Status: SHIPPED | OUTPATIENT
Start: 2023-12-18

## 2023-12-18 RX ORDER — TESTOSTERONE 20.25 MG/1.25G
GEL TOPICAL
Qty: 150 G | Refills: 0 | Status: SHIPPED | OUTPATIENT
Start: 2023-12-18 | End: 2024-01-11

## 2023-12-19 ENCOUNTER — NURSE TRIAGE (OUTPATIENT)
Dept: INTERNAL MEDICINE | Facility: CLINIC | Age: 58
End: 2023-12-19
Payer: COMMERCIAL

## 2023-12-19 NOTE — TELEPHONE ENCOUNTER
"Pt calling stating that he pulled a muscle in his groin a month ago. Ever since then he has been having pain in his lower right abdomen. Pt states that it is not severe. It is just \"there.\" Pt is not having any other symptoms at this time. The pain is worse if he is very active.     Pt calling to schedule an appointment. Does not feel that this is Urgent.  Reason for Disposition   Age > 60 years    Additional Information   Negative: Passed out (i.e., fainted, collapsed and was not responding)   Negative: Shock suspected (e.g., cold/pale/clammy skin, too weak to stand, low BP, rapid pulse)   Negative: Sounds like a life-threatening emergency to the triager   Negative: Followed an abdomen (stomach) injury   Negative: Chest pain   Negative: Pain is mainly in upper abdomen (if needed ask: 'is it mainly above the belly button?')   Negative: Abdomen bloating or swelling are main symptoms   Negative: SEVERE abdominal pain (e.g., excruciating)   Negative: Vomiting red blood or black (coffee ground) material   Negative: Blood in bowel movements  (Exception: Blood on surface of BM with constipation.)   Negative: Black or tarry bowel movements  (Exception: Chronic-unchanged black-grey BMs AND is taking iron pills or Pepto-Bismol.)   Negative: Unable to urinate (or only a few drops) and bladder feels very full   Negative: Pain in scrotum persists > 1 hour   Negative: MILD TO MODERATE constant pain lasting > 2 hours   Negative: Vomiting bile (green color)   Negative: Patient sounds very sick or weak to the triager   Negative: Vomiting and abdomen looks much more swollen than usual   Negative: White of the eyes have turned yellow (i.e., jaundice)   Negative: Blood in urine (red, pink, or tea-colored)   Negative: Fever > 103 F (39.4 C)   Negative: Fever > 101 F (38.3 C) and over 60 years of age   Negative: Fever > 100.0 F (37.8 C) and has diabetes mellitus or a weak immune system (e.g., HIV positive, cancer chemotherapy, organ " "transplant, splenectomy, chronic steroids)   Negative: Fever > 100.0 F (37.8 C) and bedridden (e.g., CVA, chronic illness, recovering from surgery)   Negative: MODERATE pain (e.g., interferes with normal activities) that comes and goes (cramps) lasts > 24 hours  (Exception: Pain with Vomiting or Diarrhea - see that Protocol.)    Answer Assessment - Initial Assessment Questions  1. LOCATION: \"Where does it hurt?\"       Right groin  2. RADIATION: \"Does the pain shoot anywhere else?\" (e.g., chest, back)      Lower abdomen  3. ONSET: \"When did the pain begin?\" (Minutes, hours or days ago)       A month ago  4. SUDDEN: \"Gradual or sudden onset?\"      Sudden at the time. Pt was curling (the sport).   5. PATTERN \"Does the pain come and go, or is it constant?\"     - If it comes and goes: \"How long does it last?\" \"Do you have pain now?\"      (Note: Comes and goes means the pain is intermittent. It goes away completely between bouts.)     - If constant: \"Is it getting better, staying the same, or getting worse?\"       (Note: Constant means the pain never goes away completely; most serious pain is constant and gets worse.)         6. SEVERITY: \"How bad is the pain?\"  (e.g., Scale 1-10; mild, moderate, or severe)     - MILD (1-3): Doesn't interfere with normal activities, abdomen soft and not tender to touch.      - MODERATE (4-7): Interferes with normal activities or awakens from sleep, abdomen tender to touch.      - SEVERE (8-10): Excruciating pain, doubled over, unable to do any normal activities.        mild  7. RECURRENT SYMPTOM: \"Have you ever had this type of stomach pain before?\" If Yes, ask: \"When was the last time?\" and \"What happened that time?\"       Yes, but it has never lasted this long  8. CAUSE: \"What do you think is causing the stomach pain?\"      Pulled muscle?   9. RELIEVING/AGGRAVATING FACTORS: \"What makes it better or worse?\" (e.g., antacids, bending or twisting motion, bowel movement)      Activity  10. " "OTHER SYMPTOMS: \"Do you have any other symptoms?\" (e.g., back pain, diarrhea, fever, urination pain, vomiting)        no    Protocols used: Abdominal Pain - Male-A-[place]  Karen Wagner RN  [facility]    "

## 2023-12-20 NOTE — TELEPHONE ENCOUNTER
Patient informed of provider's message below.    Appointment scheduled 1/15/24 - did not want to see a different provider.  Patient informed if symptoms get worse prior to appointment, go to urgent care for eval.

## 2024-01-11 DIAGNOSIS — R79.89 LOW TESTOSTERONE: ICD-10-CM

## 2024-01-11 RX ORDER — TESTOSTERONE 20.25 MG/1.25G
GEL TOPICAL
Qty: 150 G | Refills: 0 | Status: SHIPPED | OUTPATIENT
Start: 2024-01-11 | End: 2024-02-21

## 2024-01-15 ENCOUNTER — OFFICE VISIT (OUTPATIENT)
Dept: INTERNAL MEDICINE | Facility: CLINIC | Age: 59
End: 2024-01-15
Payer: COMMERCIAL

## 2024-01-15 VITALS
RESPIRATION RATE: 16 BRPM | DIASTOLIC BLOOD PRESSURE: 91 MMHG | BODY MASS INDEX: 25.45 KG/M2 | WEIGHT: 167.9 LBS | HEART RATE: 65 BPM | OXYGEN SATURATION: 100 % | SYSTOLIC BLOOD PRESSURE: 139 MMHG | HEIGHT: 68 IN | TEMPERATURE: 96.8 F

## 2024-01-15 DIAGNOSIS — R10.31 RLQ ABDOMINAL PAIN: ICD-10-CM

## 2024-01-15 DIAGNOSIS — Z12.11 SCREEN FOR COLON CANCER: Primary | ICD-10-CM

## 2024-01-15 LAB
ALBUMIN SERPL BCG-MCNC: 4.7 G/DL (ref 3.5–5.2)
ALBUMIN UR-MCNC: NEGATIVE MG/DL
ALP SERPL-CCNC: 69 U/L (ref 40–150)
ALT SERPL W P-5'-P-CCNC: 25 U/L (ref 0–70)
ANION GAP SERPL CALCULATED.3IONS-SCNC: 10 MMOL/L (ref 7–15)
APPEARANCE UR: CLEAR
AST SERPL W P-5'-P-CCNC: 27 U/L (ref 0–45)
BACTERIA #/AREA URNS HPF: ABNORMAL /HPF
BILIRUB SERPL-MCNC: 0.5 MG/DL
BILIRUB UR QL STRIP: NEGATIVE
BUN SERPL-MCNC: 18.3 MG/DL (ref 8–23)
CALCIUM SERPL-MCNC: 10 MG/DL (ref 8.8–10.2)
CHLORIDE SERPL-SCNC: 102 MMOL/L (ref 98–107)
COLOR UR AUTO: YELLOW
CREAT SERPL-MCNC: 1.16 MG/DL (ref 0.67–1.17)
DEPRECATED HCO3 PLAS-SCNC: 29 MMOL/L (ref 22–29)
EGFRCR SERPLBLD CKD-EPI 2021: 72 ML/MIN/1.73M2
ERYTHROCYTE [DISTWIDTH] IN BLOOD BY AUTOMATED COUNT: 12.4 % (ref 10–15)
GLUCOSE SERPL-MCNC: 103 MG/DL (ref 70–99)
GLUCOSE UR STRIP-MCNC: NEGATIVE MG/DL
HCT VFR BLD AUTO: 43.1 % (ref 40–53)
HGB BLD-MCNC: 14.3 G/DL (ref 13.3–17.7)
HGB UR QL STRIP: NEGATIVE
KETONES UR STRIP-MCNC: NEGATIVE MG/DL
LEUKOCYTE ESTERASE UR QL STRIP: NEGATIVE
MCH RBC QN AUTO: 29.7 PG (ref 26.5–33)
MCHC RBC AUTO-ENTMCNC: 33.2 G/DL (ref 31.5–36.5)
MCV RBC AUTO: 89 FL (ref 78–100)
NITRATE UR QL: NEGATIVE
PH UR STRIP: 7 [PH] (ref 5–7)
PLATELET # BLD AUTO: 221 10E3/UL (ref 150–450)
POTASSIUM SERPL-SCNC: 4.3 MMOL/L (ref 3.4–5.3)
PROT SERPL-MCNC: 7.4 G/DL (ref 6.4–8.3)
RBC # BLD AUTO: 4.82 10E6/UL (ref 4.4–5.9)
RBC #/AREA URNS AUTO: ABNORMAL /HPF
SODIUM SERPL-SCNC: 141 MMOL/L (ref 135–145)
SP GR UR STRIP: 1.01 (ref 1–1.03)
UROBILINOGEN UR STRIP-ACNC: 0.2 E.U./DL
WBC # BLD AUTO: 5.4 10E3/UL (ref 4–11)
WBC #/AREA URNS AUTO: ABNORMAL /HPF

## 2024-01-15 PROCEDURE — 99213 OFFICE O/P EST LOW 20 MIN: CPT | Performed by: INTERNAL MEDICINE

## 2024-01-15 PROCEDURE — 36415 COLL VENOUS BLD VENIPUNCTURE: CPT | Performed by: INTERNAL MEDICINE

## 2024-01-15 PROCEDURE — 80053 COMPREHEN METABOLIC PANEL: CPT | Performed by: INTERNAL MEDICINE

## 2024-01-15 PROCEDURE — 81001 URINALYSIS AUTO W/SCOPE: CPT | Performed by: INTERNAL MEDICINE

## 2024-01-15 PROCEDURE — 85027 COMPLETE CBC AUTOMATED: CPT | Performed by: INTERNAL MEDICINE

## 2024-01-15 ASSESSMENT — PAIN SCALES - GENERAL: PAINLEVEL: NO PAIN (1)

## 2024-01-15 NOTE — LETTER
January 16, 2024      Anant Mena  [id number] [id number]        Dear ,    We are writing to inform you of your test results.    Your test results fall within the expected range(s) or remain unchanged from previous results.  Please continue with current treatment plan.    Resulted Orders   CBC with platelets   Result Value Ref Range    WBC Count 5.4 4.0 - 11.0 10e3/uL    RBC Count 4.82 4.40 - 5.90 10e6/uL    Hemoglobin 14.3 13.3 - 17.7 g/dL    Hematocrit 43.1 40.0 - 53.0 %    MCV 89 78 - 100 fL    MCH 29.7 26.5 - 33.0 pg    MCHC 33.2 31.5 - 36.5 g/dL    RDW 12.4 10.0 - 15.0 %    Platelet Count 221 150 - 450 10e3/uL   Comprehensive metabolic panel (BMP + Alb, Alk Phos, ALT, AST, Total. Bili, TP)   Result Value Ref Range    Sodium 141 135 - 145 mmol/L      Comment:      Reference intervals for this test were updated on 09/26/2023 to more accurately reflect our healthy population. There may be differences in the flagging of prior results with similar values performed with this method. Interpretation of those prior results can be made in the context of the updated reference intervals.     Potassium 4.3 3.4 - 5.3 mmol/L    Carbon Dioxide (CO2) 29 22 - 29 mmol/L    Anion Gap 10 7 - 15 mmol/L    Urea Nitrogen 18.3 8.0 - 23.0 mg/dL    Creatinine 1.16 0.67 - 1.17 mg/dL    GFR Estimate 72 >60 mL/min/1.73m2    Calcium 10.0 8.8 - 10.2 mg/dL    Chloride 102 98 - 107 mmol/L    Glucose 103 (H) 70 - 99 mg/dL    Alkaline Phosphatase 69 40 - 150 U/L      Comment:      Reference intervals for this test were updated on 11/14/2023 to more accurately reflect our healthy population. There may be differences in the flagging of prior results with similar values performed with this method. Interpretation of those prior results can be made in the context of the updated reference intervals.    AST 27 0 - 45 U/L      Comment:      Reference intervals for this test were updated on 6/12/2023 to more accurately  reflect our healthy population. There may be differences in the flagging of prior results with similar values performed with this method. Interpretation of those prior results can be made in the context of the updated reference intervals.    ALT 25 0 - 70 U/L      Comment:      Reference intervals for this test were updated on 6/12/2023 to more accurately reflect our healthy population. There may be differences in the flagging of prior results with similar values performed with this method. Interpretation of those prior results can be made in the context of the updated reference intervals.      Protein Total 7.4 6.4 - 8.3 g/dL    Albumin 4.7 3.5 - 5.2 g/dL    Bilirubin Total 0.5 <=1.2 mg/dL   UA with Microscopic reflex to Culture - lab collect   Result Value Ref Range    Color Urine Yellow Colorless, Straw, Light Yellow, Yellow    Appearance Urine Clear Clear    Glucose Urine Negative Negative mg/dL    Bilirubin Urine Negative Negative    Ketones Urine Negative Negative mg/dL    Specific Gravity Urine 1.015 1.003 - 1.035    Blood Urine Negative Negative    pH Urine 7.0 5.0 - 7.0    Protein Albumin Urine Negative Negative mg/dL    Urobilinogen Urine 0.2 0.2, 1.0 E.U./dL    Nitrite Urine Negative Negative    Leukocyte Esterase Urine Negative Negative   UA Microscopic with Reflex to Culture   Result Value Ref Range    Bacteria Urine Few (A) None Seen /HPF    RBC Urine 0-2 0-2 /HPF /HPF    WBC Urine None Seen 0-5 /HPF /HPF    Narrative    Urine Culture not indicated       If you have any questions or concerns, please call the clinic at the number listed above.       Sincerely,      Elgin Patel MD             Split-Thickness Skin Graft Text: The defect edges were debeveled with a #15 scalpel blade.  Given the location of the defect, shape of the defect and the proximity to free margins a split thickness skin graft was deemed most appropriate.  Using a sterile surgical marker, the primary defect shape was transferred to the donor site. The split thickness graft was then harvested.  The skin graft was then placed in the primary defect and oriented appropriately.

## 2024-01-15 NOTE — PROGRESS NOTES
"  Assessment & Plan     Screen for colon cancer  Has colonoscopy tomorrow     RLQ abdominal pain  Clinically muscle strain, possible early hernia  Assess for acute infection, stones   - CBC with platelets  - Comprehensive metabolic panel (BMP + Alb, Alk Phos, ALT, AST, Total. Bili, TP)  - UA with Microscopic reflex to Culture - lab collect             BMI:   Estimated body mass index is 25.34 kg/m  as calculated from the following:    Height as of this encounter: 1.734 m (5' 8.25\").    Weight as of this encounter: 76.2 kg (167 lb 14.4 oz).       See Patient Instructions    MD EN Garcia [facility] SELAM Ny is a 58 year old, presenting for the following health issues:  Abdominal Pain (Pt c/o abdominal discomfort x1.5 months; always there)        1/15/2024     1:16 PM   Additional Questions   Roomed by Naz GATICA   Accompanied by n/a       History of Present Illness       Reason for visit:  Pain in stomach  Symptom onset:  More than a month  Symptoms include:  Pain  Symptom intensity:  Mild  Symptom progression:  Staying the same  Had these symptoms before:  No  What makes it worse:  Exercise  What makes it better:  Rest    He eats 0-1 servings of fruits and vegetables daily.He consumes 0 sweetened beverage(s) daily.He exercises with enough effort to increase his heart rate 20 to 29 minutes per day.  He exercises with enough effort to increase his heart rate 4 days per week.   He is taking medications regularly.           Presents with RLQ abdominal pain for 2 months. First started when playing curling. Then noted with splitting wood. Now pain has improved but is present as an awareness at all times   No change of bowel habits, urination, no fever, chills. No nausea , vomiting.   Has scheduled colonoscopy tomorrow.   No abdominal wall bulging.       Review of Systems   Constitutional, HEENT, cardiovascular, pulmonary, gi and gu systems are negative, except as otherwise " "noted.      Objective    BP (!) 139/91 (BP Location: Left arm, Cuff Size: Adult Regular)   Pulse 65   Temp 96.8  F (36  C) (Tympanic)   Resp 16   Ht 1.734 m (5' 8.25\")   Wt 76.2 kg (167 lb 14.4 oz)   SpO2 100%   BMI 25.34 kg/m    Body mass index is 25.34 kg/m .  Physical Exam   GENERAL: healthy, alert and no distress  ABDOMEN: soft, nontender, no hepatosplenomegaly, no masses and bowel sounds normal  MS: no gross musculoskeletal defects noted, no edema    Office Visit on 10/17/2023   Component Date Value Ref Range Status    Cholesterol 10/17/2023 232 (H)  <200 mg/dL Final    Triglycerides 10/17/2023 138  <150 mg/dL Final    Direct Measure HDL 10/17/2023 64  >=40 mg/dL Final    LDL Cholesterol Calculated 10/17/2023 140 (H)  <=100 mg/dL Final    Non HDL Cholesterol 10/17/2023 168 (H)  <130 mg/dL Final    WBC Count 10/17/2023 4.5  4.0 - 11.0 10e3/uL Final    RBC Count 10/17/2023 5.31  4.40 - 5.90 10e6/uL Final    Hemoglobin 10/17/2023 16.0  13.3 - 17.7 g/dL Final    Hematocrit 10/17/2023 47.2  40.0 - 53.0 % Final    MCV 10/17/2023 89  78 - 100 fL Final    MCH 10/17/2023 30.1  26.5 - 33.0 pg Final    MCHC 10/17/2023 33.9  31.5 - 36.5 g/dL Final    RDW 10/17/2023 12.7  10.0 - 15.0 % Final    Platelet Count 10/17/2023 208  150 - 450 10e3/uL Final    Sodium 10/17/2023 139  135 - 145 mmol/L Final    Reference intervals for this test were updated on 09/26/2023 to more accurately reflect our healthy population. There may be differences in the flagging of prior results with similar values performed with this method. Interpretation of those prior results can be made in the context of the updated reference intervals.     Potassium 10/17/2023 4.4  3.4 - 5.3 mmol/L Final    Carbon Dioxide (CO2) 10/17/2023 27  22 - 29 mmol/L Final    Anion Gap 10/17/2023 10  7 - 15 mmol/L Final    Urea Nitrogen 10/17/2023 17.6  8.0 - 23.0 mg/dL Final    Creatinine 10/17/2023 1.24 (H)  0.67 - 1.17 mg/dL Final    GFR Estimate 10/17/2023 67  " >60 mL/min/1.73m2 Final    Calcium 10/17/2023 9.9  8.8 - 10.2 mg/dL Final    Chloride 10/17/2023 102  98 - 107 mmol/L Final    Glucose 10/17/2023 97  70 - 99 mg/dL Final    Alkaline Phosphatase 10/17/2023 76  40 - 129 U/L Final    AST 10/17/2023 35  0 - 45 U/L Final    Reference intervals for this test were updated on 6/12/2023 to more accurately reflect our healthy population. There may be differences in the flagging of prior results with similar values performed with this method. Interpretation of those prior results can be made in the context of the updated reference intervals.    ALT 10/17/2023 28  0 - 70 U/L Final    Reference intervals for this test were updated on 6/12/2023 to more accurately reflect our healthy population. There may be differences in the flagging of prior results with similar values performed with this method. Interpretation of those prior results can be made in the context of the updated reference intervals.      Protein Total 10/17/2023 7.8  6.4 - 8.3 g/dL Final    Albumin 10/17/2023 5.1  3.5 - 5.2 g/dL Final    Bilirubin Total 10/17/2023 0.6  <=1.2 mg/dL Final    Prostate Specific Antigen Screen 10/17/2023 0.71  0.00 - 4.50 ng/mL Final    TSH 10/17/2023 2.69  0.30 - 4.20 uIU/mL Final    Color Urine 10/17/2023 Yellow  Colorless, Straw, Light Yellow, Yellow Final    Appearance Urine 10/17/2023 Clear  Clear Final    Glucose Urine 10/17/2023 Negative  Negative mg/dL Final    Bilirubin Urine 10/17/2023 Negative  Negative Final    Ketones Urine 10/17/2023 Negative  Negative mg/dL Final    Specific Gravity Urine 10/17/2023 1.020  1.003 - 1.035 Final    Blood Urine 10/17/2023 Negative  Negative Final    pH Urine 10/17/2023 7.0  5.0 - 7.0 Final    Protein Albumin Urine 10/17/2023 Negative  Negative mg/dL Final    Urobilinogen Urine 10/17/2023 0.2  0.2, 1.0 E.U./dL Final    Nitrite Urine 10/17/2023 Negative  Negative Final    Leukocyte Esterase Urine 10/17/2023 Negative  Negative Final     Testosterone Total 10/17/2023 781  240 - 950 ng/dL Final    Bacteria Urine 10/17/2023 None Seen  None Seen /HPF Final    RBC Urine 10/17/2023 0-2  0-2 /HPF /HPF Final    WBC Urine 10/17/2023 0-5  0-5 /HPF /HPF Final

## 2024-01-16 ENCOUNTER — TRANSFERRED RECORDS (OUTPATIENT)
Dept: HEALTH INFORMATION MANAGEMENT | Facility: CLINIC | Age: 59
End: 2024-01-16
Payer: COMMERCIAL

## 2024-02-21 DIAGNOSIS — R79.89 LOW TESTOSTERONE: ICD-10-CM

## 2024-02-21 RX ORDER — TESTOSTERONE 20.25 MG/1.25G
GEL TOPICAL
Qty: 150 G | Refills: 0 | Status: SHIPPED | OUTPATIENT
Start: 2024-02-21 | End: 2024-03-01

## 2024-03-01 DIAGNOSIS — R79.89 LOW TESTOSTERONE: ICD-10-CM

## 2024-03-01 RX ORDER — TESTOSTERONE 20.25 MG/1.25G
GEL TOPICAL
Qty: 150 G | Refills: 0 | Status: SHIPPED | OUTPATIENT
Start: 2024-03-01 | End: 2024-05-09

## 2024-04-23 ENCOUNTER — TRANSFERRED RECORDS (OUTPATIENT)
Dept: HEALTH INFORMATION MANAGEMENT | Facility: CLINIC | Age: 59
End: 2024-04-23
Payer: COMMERCIAL

## 2024-05-09 DIAGNOSIS — R79.89 LOW TESTOSTERONE: ICD-10-CM

## 2024-05-09 RX ORDER — TESTOSTERONE 20.25 MG/1.25G
GEL TOPICAL
Qty: 150 G | Refills: 0 | Status: SHIPPED | OUTPATIENT
Start: 2024-05-09 | End: 2024-07-08

## 2024-05-20 ENCOUNTER — TRANSFERRED RECORDS (OUTPATIENT)
Dept: HEALTH INFORMATION MANAGEMENT | Facility: CLINIC | Age: 59
End: 2024-05-20
Payer: COMMERCIAL

## 2024-06-06 ENCOUNTER — OFFICE VISIT (OUTPATIENT)
Dept: FAMILY MEDICINE | Facility: CLINIC | Age: 59
End: 2024-06-06
Payer: COMMERCIAL

## 2024-06-06 VITALS
HEIGHT: 68 IN | SYSTOLIC BLOOD PRESSURE: 130 MMHG | BODY MASS INDEX: 25.13 KG/M2 | WEIGHT: 165.8 LBS | OXYGEN SATURATION: 97 % | HEART RATE: 60 BPM | TEMPERATURE: 97.7 F | DIASTOLIC BLOOD PRESSURE: 94 MMHG | RESPIRATION RATE: 20 BRPM

## 2024-06-06 DIAGNOSIS — M17.11 PRIMARY OSTEOARTHRITIS OF RIGHT KNEE: ICD-10-CM

## 2024-06-06 DIAGNOSIS — Z01.818 PREOP GENERAL PHYSICAL EXAM: Primary | ICD-10-CM

## 2024-06-06 DIAGNOSIS — K21.9 GASTROESOPHAGEAL REFLUX DISEASE WITHOUT ESOPHAGITIS: ICD-10-CM

## 2024-06-06 DIAGNOSIS — R03.0 ELEVATED BLOOD PRESSURE READING WITHOUT DIAGNOSIS OF HYPERTENSION: ICD-10-CM

## 2024-06-06 DIAGNOSIS — E78.00 HYPERCHOLESTEREMIA: ICD-10-CM

## 2024-06-06 DIAGNOSIS — N40.0 BENIGN PROSTATIC HYPERPLASIA WITHOUT LOWER URINARY TRACT SYMPTOMS: ICD-10-CM

## 2024-06-06 LAB
ERYTHROCYTE [DISTWIDTH] IN BLOOD BY AUTOMATED COUNT: 12.6 % (ref 10–15)
HCT VFR BLD AUTO: 41 % (ref 40–53)
HGB BLD-MCNC: 13.3 G/DL (ref 13.3–17.7)
MCH RBC QN AUTO: 28.9 PG (ref 26.5–33)
MCHC RBC AUTO-ENTMCNC: 32.4 G/DL (ref 31.5–36.5)
MCV RBC AUTO: 89 FL (ref 78–100)
PLATELET # BLD AUTO: 205 10E3/UL (ref 150–450)
RBC # BLD AUTO: 4.6 10E6/UL (ref 4.4–5.9)
WBC # BLD AUTO: 5.9 10E3/UL (ref 4–11)

## 2024-06-06 PROCEDURE — 80048 BASIC METABOLIC PNL TOTAL CA: CPT | Performed by: FAMILY MEDICINE

## 2024-06-06 PROCEDURE — 85027 COMPLETE CBC AUTOMATED: CPT | Performed by: FAMILY MEDICINE

## 2024-06-06 PROCEDURE — 36415 COLL VENOUS BLD VENIPUNCTURE: CPT | Performed by: FAMILY MEDICINE

## 2024-06-06 PROCEDURE — 93000 ELECTROCARDIOGRAM COMPLETE: CPT | Performed by: FAMILY MEDICINE

## 2024-06-06 PROCEDURE — 99214 OFFICE O/P EST MOD 30 MIN: CPT | Performed by: FAMILY MEDICINE

## 2024-06-06 SDOH — HEALTH STABILITY: PHYSICAL HEALTH: ON AVERAGE, HOW MANY DAYS PER WEEK DO YOU ENGAGE IN MODERATE TO STRENUOUS EXERCISE (LIKE A BRISK WALK)?: 5 DAYS

## 2024-06-06 ASSESSMENT — SOCIAL DETERMINANTS OF HEALTH (SDOH)
HOW OFTEN DO YOU ATTENT MEETINGS OF THE CLUB OR ORGANIZATION YOU BELONG TO?: 1 TO 4 TIMES PER YEAR
DO YOU BELONG TO ANY CLUBS OR ORGANIZATIONS SUCH AS CHURCH GROUPS UNIONS, FRATERNAL OR ATHLETIC GROUPS, OR SCHOOL GROUPS?: YES
HOW OFTEN DO YOU ATTEND CHURCH OR RELIGIOUS SERVICES?: MORE THAN 4 TIMES PER YEAR
HOW OFTEN DO YOU GET TOGETHER WITH FRIENDS OR RELATIVES?: ONCE A WEEK
IN A TYPICAL WEEK, HOW MANY TIMES DO YOU TALK ON THE PHONE WITH FAMILY, FRIENDS, OR NEIGHBORS?: MORE THAN THREE TIMES A WEEK

## 2024-06-06 ASSESSMENT — LIFESTYLE VARIABLES
HOW OFTEN DO YOU HAVE A DRINK CONTAINING ALCOHOL: 2-3 TIMES A WEEK
HOW MANY STANDARD DRINKS CONTAINING ALCOHOL DO YOU HAVE ON A TYPICAL DAY: 1 OR 2

## 2024-06-06 ASSESSMENT — PAIN SCALES - GENERAL: PAINLEVEL: NO PAIN (0)

## 2024-06-06 NOTE — PROGRESS NOTES
Preoperative Evaluation  [facility]  [address] [facility] [address] [facility] [address]  Phone: [phone]  Primary Provider: Elgin Patel MD  Pre-op Performing Provider: Orlando Ward MD  Jun 6, 2024 6/6/2024   Surgical Information   What procedure is being done? pre op physical   Facility or Hospital where procedure/surgery will be performed: [facility]   Who is doing the procedure / surgery? dr hailee rosado   Date of surgery / procedure: 06/12/2024   Time of surgery / procedure: pm   Where do you plan to recover after surgery? at home with family     Fax number for surgical facility:     Assessment & Plan     The proposed surgical procedure is considered INTERMEDIATE risk.    Preop general physical exam  - EKG 12-lead complete w/read - Clinics  - CBC with platelets  - Basic metabolic panel  (Ca, Cl, CO2, Creat, Gluc, K, Na, BUN)  - CBC with platelets  - Basic metabolic panel  (Ca, Cl, CO2, Creat, Gluc, K, Na, BUN)    Primary osteoarthritis of right knee    Elevated blood pressure reading without diagnosis of hypertension    Hypercholesteremia    Benign prostatic hyperplasia without lower urinary tract symptoms    Gastroesophageal reflux disease without esophagitis       - No identified additional risk factors other than previously addressed    Antiplatelet or Anticoagulation Medication Instructions   - Patient is on no antiplatelet or anticoagulation medications.    Additional Medication Instructions  Take all scheduled medications on the day of surgery EXCEPT for modifications listed below:   - ibuprofen (Advil, Motrin): DO NOT TAKE 1 day before surgery.    - sildenafil: DO NOT TAKE for 24 hours.    Recommendation  Approval given to proceed with proposed procedure        Key Ny is a 58 year old, presenting for the following:  Pre-Op Exam          6/6/2024     3:36 PM   Additional Questions   Roomed by Karsten   Accompanied by self     HPI  related to upcoming procedure: Preoperative evaluation prior to elective right knee arthroscopic repair        6/6/2024   Pre-Op Questionnaire   Have you ever had a heart attack or stroke? No   Have you ever had surgery on your heart or blood vessels, such as a stent placement, a coronary artery bypass, or surgery on an artery in your head, neck, heart, or legs? No   Do you have chest pain with activity? No   Do you have a history of heart failure? No   Do you currently have a cold, bronchitis or symptoms of other infection? No   Do you have a cough, shortness of breath, or wheezing? No   Do you or anyone in your family have previous history of blood clots? No   Do you or does anyone in your family have a serious bleeding problem such as prolonged bleeding following surgeries or cuts? No   Have you ever had problems with anemia or been told to take iron pills? No   Have you had any abnormal blood loss such as black, tarry or bloody stools? No   Have you ever had a blood transfusion? No   Are you willing to have a blood transfusion if it is medically needed before, during, or after your surgery? Yes   Have you or any of your relatives ever had problems with anesthesia? No   Do you have sleep apnea, excessive snoring or daytime drowsiness? No   Do you have any artifical heart valves or other implanted medical devices like a pacemaker, defibrillator, or continuous glucose monitor? No   Do you have artificial joints? No   Are you allergic to latex? No     Health Care Directive  Patient does not have a Health Care Directive or Living Will: Discussed advance care planning with patient; information given to patient to review.    Preoperative Review of    reviewed - controlled substances reflected in medication list.      Status of Chronic Conditions:  See problem list for active medical problems.  Problems all longstanding and stable, except as noted/documented.  See ROS for pertinent symptoms related to these  conditions.    Patient Active Problem List    Diagnosis Date Noted    Benign prostatic hyperplasia without lower urinary tract symptoms 10/17/2023     Priority: Medium    Erectile dysfunction, unspecified erectile dysfunction type 10/17/2023     Priority: Medium    Low testosterone 05/26/2017     Priority: Medium    Anxiety 05/27/2014     Priority: Medium    HYPERLIPIDEMIA LDL GOAL <130 10/31/2010     Priority: Medium    Low back pain 01/29/2009     Priority: Medium    Lumbar radiculopathy 01/29/2009     Priority: Medium    Back pain 01/23/2009     Priority: Medium    Insomnia 01/23/2009     Priority: Medium    Hypercholesteremia      Priority: Medium    GERD (gastroesophageal reflux disease)      Priority: Medium      Past Medical History:   Diagnosis Date    Back pain     GERD (gastroesophageal reflux disease)     Hypercholesteremia     Insomnia     Sleep apnea      Past Surgical History:   Procedure Laterality Date    COLONOSCOPY  12/24/2013    Procedure: COLONOSCOPY;  COLONOSCOPY      ESOPHAGOSCOPY, GASTROSCOPY, DUODENOSCOPY (EGD), COMBINED  08/16/2013    Procedure: COMBINED ESOPHAGOSCOPY, GASTROSCOPY, DUODENOSCOPY (EGD);  ESOPHAGOSCOPY, GASTROSCOPY, DUODENOSCOPY ;  Surgeon: Randy Ohara MD;  Location:  GI    KNEE SURGERY  04/01/2015    Lt Meniscus Repair : Dr. Philip Slade    ORTHOPEDIC SURGERY  2013     Current Outpatient Medications   Medication Sig Dispense Refill    cetirizine (ZYRTEC) 10 MG tablet Take 1 tablet by mouth daily as needed. 30 tablet 5    cholecalciferol (VITAMIN D) 1000 UNIT tablet Take 1 tablet by mouth daily.      famotidine (PEPCID) 20 MG tablet Take 1 tablet (20 mg) by mouth 2 times daily      finasteride (PROSCAR) 5 MG tablet TAKE 1 TABLET BY MOUTH  DAILY 90 tablet 3    ibuprofen 200 MG capsule Take 200 mg by mouth every 4 hours as needed for fever 120 capsule 3    ketoconazole (NIZORAL) 2 % shampoo   5    metroNIDAZOLE (METROGEL) 1 % gel Apply topically daily 60 g 11    Omega-3  "Fatty Acids (FISH OIL PO) Take  by mouth.      omeprazole (PRILOSEC OTC) 20 MG tablet Take 1 tablet (20 mg) by mouth daily as needed 30 tablet 5    sildenafil (VIAGRA) 100 MG tablet TAKE 1/2 TO 1 TABLET BY  MOUTH AS NEEDED 1/2 TO 4  HOURS BEFORE INTERCOURSE;  NEVER WITH NITROGLYCERIN,  TERAZOSIN OR DOXAZOSIN 13 tablet 11    simvastatin (ZOCOR) 20 MG tablet TAKE 1 TABLET BY MOUTH AT  BEDTIME 90 tablet 3    tadalafil (CIALIS) 5 MG tablet TAKE 1 TABLET BY MOUTH EVERY 24  HOURS 90 tablet 3    Testosterone 1.62 % GEL APPLY 3 PUMPS TOPICALLY DAILY TO CLEAN, DRY, INTACT SKIN OF UPPER ARMS AND SHOULDERS AS DIRECTED 150 g 0    fluticasone (FLONASE) 50 MCG/ACT nasal spray Spray 1-2 sprays into both nostrils daily. (Patient not taking: Reported on 6/6/2024) 3 Package 3       No Known Allergies     Social History     Tobacco Use    Smoking status: Never    Smokeless tobacco: Never   Substance Use Topics    Alcohol use: Yes     Comment: ocas       History   Drug Use No               Objective    BP (!) 145/85 (BP Location: Right arm, Patient Position: Sitting, Cuff Size: Adult Large)   Pulse 60   Temp 97.7  F (36.5  C) (Oral)   Resp 20   Ht 1.721 m (5' 7.75\")   Wt 75.2 kg (165 lb 12.8 oz)   SpO2 97%   BMI 25.40 kg/m     Estimated body mass index is 25.4 kg/m  as calculated from the following:    Height as of this encounter: 1.721 m (5' 7.75\").    Weight as of this encounter: 75.2 kg (165 lb 12.8 oz).  Physical Exam  GENERAL: alert and no distress  EYES: Eyes grossly normal to inspection, PERRL and conjunctivae and sclerae normal  HENT: ear canals and TM's normal, nose and mouth without ulcers or lesions  NECK: no adenopathy, no asymmetry, masses, or scars  RESP: lungs clear to auscultation - no rales, rhonchi or wheezes  CV: regular rate and rhythm, normal S1 S2, no S3 or S4, no murmur, click or rub, no peripheral edema  ABDOMEN: soft, nontender, no hepatosplenomegaly, no masses and bowel sounds normal  MS: no gross " musculoskeletal defects noted, no edema  SKIN: no suspicious lesions or rashes  NEURO: Normal strength and tone, mentation intact and speech normal  PSYCH: mentation appears normal, affect normal/bright    Recent Labs   Lab Test 01/15/24  1353 10/17/23  0907   HGB 14.3 16.0    208    139   POTASSIUM 4.3 4.4   CR 1.16 1.24*        Diagnostics  Last Comprehensive Metabolic Panel:  Lab Results   Component Value Date     06/06/2024    POTASSIUM 4.7 06/06/2024    CHLORIDE 103 06/06/2024    CO2 26 06/06/2024    ANIONGAP 11 06/06/2024    GLC 93 06/06/2024    BUN 23.1 (H) 06/06/2024    CR 1.21 (H) 06/06/2024    GFRESTIMATED 69 06/06/2024    JIM 9.6 06/06/2024           EKG, independently reviewed by me, sinus bradycardia with ventricular rate 53 bpm.  No acute ST or T wave change.  Good R wave progression, no previous EKG for comparison.    Revised Cardiac Risk Index (RCRI)  The patient has the following serious cardiovascular risks for perioperative complications:   - No serious cardiac risks = 0 points     RCRI Interpretation: 0 points: Class I (very low risk - 0.4% complication rate)         Signed Electronically by: Orlando Ward MD  Copy of this evaluation report is provided to requesting physician.

## 2024-06-07 LAB
ANION GAP SERPL CALCULATED.3IONS-SCNC: 11 MMOL/L (ref 7–15)
BUN SERPL-MCNC: 23.1 MG/DL (ref 8–23)
CALCIUM SERPL-MCNC: 9.6 MG/DL (ref 8.8–10.2)
CHLORIDE SERPL-SCNC: 103 MMOL/L (ref 98–107)
CREAT SERPL-MCNC: 1.21 MG/DL (ref 0.67–1.17)
DEPRECATED HCO3 PLAS-SCNC: 26 MMOL/L (ref 22–29)
EGFRCR SERPLBLD CKD-EPI 2021: 69 ML/MIN/1.73M2
GLUCOSE SERPL-MCNC: 93 MG/DL (ref 70–99)
POTASSIUM SERPL-SCNC: 4.7 MMOL/L (ref 3.4–5.3)
SODIUM SERPL-SCNC: 140 MMOL/L (ref 135–145)

## 2024-06-27 ENCOUNTER — TRANSFERRED RECORDS (OUTPATIENT)
Dept: HEALTH INFORMATION MANAGEMENT | Facility: CLINIC | Age: 59
End: 2024-06-27
Payer: COMMERCIAL

## 2024-07-07 DIAGNOSIS — R79.89 LOW TESTOSTERONE: ICD-10-CM

## 2024-07-08 RX ORDER — TESTOSTERONE 20.25 MG/1.25G
GEL TOPICAL
Qty: 150 G | Refills: 0 | Status: SHIPPED | OUTPATIENT
Start: 2024-07-08 | End: 2024-09-04

## 2024-09-04 DIAGNOSIS — R79.89 LOW TESTOSTERONE: ICD-10-CM

## 2024-09-04 DIAGNOSIS — E78.00 HYPERCHOLESTEREMIA: ICD-10-CM

## 2024-09-04 DIAGNOSIS — R39.11 BENIGN PROSTATIC HYPERPLASIA WITH URINARY HESITANCY: ICD-10-CM

## 2024-09-04 DIAGNOSIS — N40.1 BENIGN PROSTATIC HYPERPLASIA WITH URINARY HESITANCY: ICD-10-CM

## 2024-09-04 RX ORDER — FINASTERIDE 5 MG/1
TABLET, FILM COATED ORAL
Qty: 90 TABLET | Refills: 3 | OUTPATIENT
Start: 2024-09-04

## 2024-09-04 RX ORDER — TESTOSTERONE 20.25 MG/1.25G
GEL TOPICAL
Qty: 150 G | Refills: 0 | Status: SHIPPED | OUTPATIENT
Start: 2024-09-04

## 2024-09-04 RX ORDER — SIMVASTATIN 20 MG
TABLET ORAL
Qty: 90 TABLET | Refills: 3 | OUTPATIENT
Start: 2024-09-04

## 2024-09-26 DIAGNOSIS — E78.00 HYPERCHOLESTEREMIA: ICD-10-CM

## 2024-09-26 DIAGNOSIS — R39.11 BENIGN PROSTATIC HYPERPLASIA WITH URINARY HESITANCY: ICD-10-CM

## 2024-09-26 DIAGNOSIS — N40.1 BENIGN PROSTATIC HYPERPLASIA WITH URINARY HESITANCY: ICD-10-CM

## 2024-09-26 RX ORDER — SIMVASTATIN 20 MG
TABLET ORAL
Qty: 90 TABLET | Refills: 0 | Status: SHIPPED | OUTPATIENT
Start: 2024-09-26

## 2024-09-26 RX ORDER — FINASTERIDE 5 MG/1
TABLET, FILM COATED ORAL
Qty: 90 TABLET | Refills: 0 | Status: SHIPPED | OUTPATIENT
Start: 2024-09-26

## 2024-10-17 ENCOUNTER — TRANSFERRED RECORDS (OUTPATIENT)
Dept: HEALTH INFORMATION MANAGEMENT | Facility: CLINIC | Age: 59
End: 2024-10-17
Payer: COMMERCIAL

## 2024-10-22 ENCOUNTER — OFFICE VISIT (OUTPATIENT)
Dept: INTERNAL MEDICINE | Facility: CLINIC | Age: 59
End: 2024-10-22
Attending: INTERNAL MEDICINE
Payer: COMMERCIAL

## 2024-10-22 VITALS
HEIGHT: 69 IN | OXYGEN SATURATION: 100 % | SYSTOLIC BLOOD PRESSURE: 148 MMHG | HEART RATE: 62 BPM | RESPIRATION RATE: 14 BRPM | WEIGHT: 166.6 LBS | TEMPERATURE: 96.8 F | DIASTOLIC BLOOD PRESSURE: 93 MMHG | BODY MASS INDEX: 24.68 KG/M2

## 2024-10-22 DIAGNOSIS — E78.5 HYPERLIPIDEMIA LDL GOAL <130: ICD-10-CM

## 2024-10-22 DIAGNOSIS — F51.01 PRIMARY INSOMNIA: ICD-10-CM

## 2024-10-22 DIAGNOSIS — Z00.00 ENCOUNTER FOR PREVENTATIVE ADULT HEALTH CARE EXAMINATION: Primary | ICD-10-CM

## 2024-10-22 DIAGNOSIS — R35.1 BENIGN PROSTATIC HYPERPLASIA WITH NOCTURIA: ICD-10-CM

## 2024-10-22 DIAGNOSIS — F43.9 STRESS: ICD-10-CM

## 2024-10-22 DIAGNOSIS — E78.00 HYPERCHOLESTEREMIA: ICD-10-CM

## 2024-10-22 DIAGNOSIS — R03.0 ELEVATED BP WITHOUT DIAGNOSIS OF HYPERTENSION: ICD-10-CM

## 2024-10-22 DIAGNOSIS — N52.9 ERECTILE DYSFUNCTION, UNSPECIFIED ERECTILE DYSFUNCTION TYPE: ICD-10-CM

## 2024-10-22 DIAGNOSIS — Z12.5 SCREENING FOR PROSTATE CANCER: ICD-10-CM

## 2024-10-22 DIAGNOSIS — Z80.3 FAMILY HISTORY OF MALIGNANT NEOPLASM OF BREAST: ICD-10-CM

## 2024-10-22 DIAGNOSIS — N40.1 BENIGN PROSTATIC HYPERPLASIA WITH NOCTURIA: ICD-10-CM

## 2024-10-22 LAB
ALBUMIN SERPL BCG-MCNC: 4.7 G/DL (ref 3.5–5.2)
ALBUMIN UR-MCNC: NEGATIVE MG/DL
ALP SERPL-CCNC: 74 U/L (ref 40–150)
ALT SERPL W P-5'-P-CCNC: 26 U/L (ref 0–70)
ANION GAP SERPL CALCULATED.3IONS-SCNC: 12 MMOL/L (ref 7–15)
APPEARANCE UR: CLEAR
AST SERPL W P-5'-P-CCNC: 32 U/L (ref 0–45)
BACTERIA #/AREA URNS HPF: ABNORMAL /HPF
BILIRUB SERPL-MCNC: 0.4 MG/DL
BILIRUB UR QL STRIP: NEGATIVE
BUN SERPL-MCNC: 15.7 MG/DL (ref 8–23)
CALCIUM SERPL-MCNC: 9.9 MG/DL (ref 8.8–10.4)
CHLORIDE SERPL-SCNC: 104 MMOL/L (ref 98–107)
CHOLEST SERPL-MCNC: 228 MG/DL
COLOR UR AUTO: YELLOW
CREAT SERPL-MCNC: 1.24 MG/DL (ref 0.67–1.17)
EGFRCR SERPLBLD CKD-EPI 2021: 66 ML/MIN/1.73M2
ERYTHROCYTE [DISTWIDTH] IN BLOOD BY AUTOMATED COUNT: 14 % (ref 10–15)
FASTING STATUS PATIENT QL REPORTED: YES
FASTING STATUS PATIENT QL REPORTED: YES
GLUCOSE SERPL-MCNC: 107 MG/DL (ref 70–99)
GLUCOSE UR STRIP-MCNC: NEGATIVE MG/DL
HCO3 SERPL-SCNC: 26 MMOL/L (ref 22–29)
HCT VFR BLD AUTO: 46.9 % (ref 40–53)
HDLC SERPL-MCNC: 65 MG/DL
HGB BLD-MCNC: 15.7 G/DL (ref 13.3–17.7)
HGB UR QL STRIP: NEGATIVE
KETONES UR STRIP-MCNC: NEGATIVE MG/DL
LDLC SERPL CALC-MCNC: 140 MG/DL
LEUKOCYTE ESTERASE UR QL STRIP: NEGATIVE
MCH RBC QN AUTO: 29.2 PG (ref 26.5–33)
MCHC RBC AUTO-ENTMCNC: 33.5 G/DL (ref 31.5–36.5)
MCV RBC AUTO: 87 FL (ref 78–100)
MUCOUS THREADS #/AREA URNS LPF: PRESENT /LPF
NITRATE UR QL: NEGATIVE
NONHDLC SERPL-MCNC: 163 MG/DL
PH UR STRIP: 8 [PH] (ref 5–7)
PLATELET # BLD AUTO: 216 10E3/UL (ref 150–450)
POTASSIUM SERPL-SCNC: 4.7 MMOL/L (ref 3.4–5.3)
PROT SERPL-MCNC: 7.5 G/DL (ref 6.4–8.3)
PSA SERPL DL<=0.01 NG/ML-MCNC: 0.72 NG/ML (ref 0–4.5)
RBC # BLD AUTO: 5.38 10E6/UL (ref 4.4–5.9)
RBC #/AREA URNS AUTO: ABNORMAL /HPF
SHBG SERPL-SCNC: 32 NMOL/L (ref 11–80)
SODIUM SERPL-SCNC: 142 MMOL/L (ref 135–145)
SP GR UR STRIP: 1.02 (ref 1–1.03)
SQUAMOUS #/AREA URNS AUTO: ABNORMAL /LPF
TRIGL SERPL-MCNC: 114 MG/DL
TSH SERPL DL<=0.005 MIU/L-ACNC: 2.66 UIU/ML (ref 0.3–4.2)
UROBILINOGEN UR STRIP-ACNC: 0.2 E.U./DL
WBC # BLD AUTO: 5.6 10E3/UL (ref 4–11)
WBC #/AREA URNS AUTO: ABNORMAL /HPF

## 2024-10-22 PROCEDURE — 80053 COMPREHEN METABOLIC PANEL: CPT | Performed by: INTERNAL MEDICINE

## 2024-10-22 PROCEDURE — 90471 IMMUNIZATION ADMIN: CPT | Performed by: INTERNAL MEDICINE

## 2024-10-22 PROCEDURE — 90673 RIV3 VACCINE NO PRESERV IM: CPT | Performed by: INTERNAL MEDICINE

## 2024-10-22 PROCEDURE — 99214 OFFICE O/P EST MOD 30 MIN: CPT | Mod: 25 | Performed by: INTERNAL MEDICINE

## 2024-10-22 PROCEDURE — 85027 COMPLETE CBC AUTOMATED: CPT | Performed by: INTERNAL MEDICINE

## 2024-10-22 PROCEDURE — 36415 COLL VENOUS BLD VENIPUNCTURE: CPT | Performed by: INTERNAL MEDICINE

## 2024-10-22 PROCEDURE — 84443 ASSAY THYROID STIM HORMONE: CPT | Performed by: INTERNAL MEDICINE

## 2024-10-22 PROCEDURE — 99396 PREV VISIT EST AGE 40-64: CPT | Mod: 25 | Performed by: INTERNAL MEDICINE

## 2024-10-22 PROCEDURE — G0103 PSA SCREENING: HCPCS | Performed by: INTERNAL MEDICINE

## 2024-10-22 PROCEDURE — 81001 URINALYSIS AUTO W/SCOPE: CPT | Performed by: INTERNAL MEDICINE

## 2024-10-22 PROCEDURE — 80061 LIPID PANEL: CPT | Performed by: INTERNAL MEDICINE

## 2024-10-22 PROCEDURE — 84403 ASSAY OF TOTAL TESTOSTERONE: CPT | Performed by: INTERNAL MEDICINE

## 2024-10-22 PROCEDURE — 84270 ASSAY OF SEX HORMONE GLOBUL: CPT | Performed by: INTERNAL MEDICINE

## 2024-10-22 RX ORDER — SIMVASTATIN 20 MG
20 TABLET ORAL AT BEDTIME
Qty: 90 TABLET | Refills: 3 | Status: SHIPPED | OUTPATIENT
Start: 2024-10-22

## 2024-10-22 RX ORDER — VARDENAFIL HYDROCHLORIDE 10 MG/1
10 TABLET ORAL DAILY PRN
Qty: 30 TABLET | Refills: 1 | Status: SHIPPED | OUTPATIENT
Start: 2024-10-22

## 2024-10-22 RX ORDER — TADALAFIL 5 MG/1
5 TABLET ORAL DAILY
Qty: 90 TABLET | Refills: 3 | Status: SHIPPED | OUTPATIENT
Start: 2024-10-22

## 2024-10-22 RX ORDER — NORTRIPTYLINE HYDROCHLORIDE 10 MG/1
10 CAPSULE ORAL AT BEDTIME
Qty: 30 CAPSULE | Refills: 0 | Status: SHIPPED | OUTPATIENT
Start: 2024-10-22

## 2024-10-22 SDOH — HEALTH STABILITY: PHYSICAL HEALTH: ON AVERAGE, HOW MANY DAYS PER WEEK DO YOU ENGAGE IN MODERATE TO STRENUOUS EXERCISE (LIKE A BRISK WALK)?: 5 DAYS

## 2024-10-22 SDOH — HEALTH STABILITY: PHYSICAL HEALTH: ON AVERAGE, HOW MANY MINUTES DO YOU ENGAGE IN EXERCISE AT THIS LEVEL?: 50 MIN

## 2024-10-22 ASSESSMENT — PAIN SCALES - GENERAL: PAINLEVEL: NO PAIN (0)

## 2024-10-22 ASSESSMENT — SOCIAL DETERMINANTS OF HEALTH (SDOH): HOW OFTEN DO YOU GET TOGETHER WITH FRIENDS OR RELATIVES?: ONCE A WEEK

## 2024-10-22 NOTE — PROGRESS NOTES
Preventive Care Visit  [facility]  Elgin Patel MD, Internal Medicine  Oct 22, 2024      Assessment & Plan     Hyperlipidemia LDL goal <130  Assess lipid panel   - Lipid panel reflex to direct LDL Non-fasting  - OFFICE/OUTPT VISIT,EST,LEVL IV    Hypercholesteremia  Continue statin   - simvastatin (ZOCOR) 20 MG tablet; Take 1 tablet (20 mg) by mouth at bedtime.    Benign prostatic hyperplasia with nocturia  Continue cialis   - tadalafil (CIALIS) 5 MG tablet; Take 1 tablet (5 mg) by mouth daily.  - OFFICE/OUTPT VISIT,EST,LEVL IV    Erectile dysfunction, unspecified erectile dysfunction type  Start Levitra PRN   - tadalafil (CIALIS) 5 MG tablet; Take 1 tablet (5 mg) by mouth daily.  - OFFICE/OUTPT VISIT,EST,LEVL IV    Encounter for preventative adult health care examination  advised regular aerobic activity, low cholesterol, low salt diet, wearing seat belt,  self examinations, sunscreen protection.Obtain screening cholesterol, immunizations reviewed.    - Hereditary cancer BRCA1/BRCA2  - Testosterone Free and Total  - vardenafil (LEVITRA) 10 MG tablet; Take 1 tablet (10 mg) by mouth daily as needed (ED).  - nortriptyline (PAMELOR) 10 MG capsule; Take 1 capsule (10 mg) by mouth at bedtime.  - CBC with platelets  - Comprehensive metabolic panel (BMP + Alb, Alk Phos, ALT, AST, Total. Bili, TP)  - PSA, screen  - TSH with free T4 reflex  - UA with Microscopic reflex to Culture - lab collect    Screening for prostate cancer  Annual PSA   - PSA, screen    Family history of malignant neoplasm of breast  Assess for breast cancer mutation   - Hereditary cancer BRCA1/BRCA2    Primary insomnia  Start Pamelor  - OFFICE/OUTPT VISIT,EST,LEVL IV    Stress  Start Pamelor   - OFFICE/OUTPT VISIT,EST,LEVL IV    Elevated BP without diagnosis of hypertension  Monitor, keep low salt diet     Patient has been advised of split billing requirements and indicates understanding: Yes        Counseling  Appropriate  preventive services were addressed with this patient via screening, questionnaire, or discussion as appropriate for fall prevention, nutrition, physical activity, Tobacco-use cessation, social engagement, weight loss and cognition.  Checklist reviewing preventive services available has been given to the patient.  Reviewed patient's diet, addressing concerns and/or questions.       See Patient Instructions    Key Ny is a 59 year old, presenting for the following:  Physical (fasting)        10/22/2024     8:54 AM   Additional Questions   Roomed by Naz GATICA   Accompanied by n/a        Health Care Directive  Patient does not have a Health Care Directive or Living Will: Patient states has Advance Directive and will bring in a copy to clinic.    HPI  Has H/O hyperlipidemia. On medical treatment and diet. No side effects. No muscle weakness, myalgias or upset stomach.   Has H/O BPH. On treatment with Cialis for daily use, no  symptoms of frequency, decreased urinary stream or dysuria. No side effects from medications.  Has h/o low Testosterone, ED. On Testosterone topical, Viagra PRN. Has had good results initially, now now as good, no normal erections, decreased libido.   Has had  increased stress with work and home. Has daughters with breast cancer  mutation, sisters with breast cancer , ovarian cancer and colon cancer.   Reports chronic insomnia, wakes up multiple times during the night.             10/22/2024   General Health   How would you rate your overall physical health? Good   Feel stress (tense, anxious, or unable to sleep) To some extent      (!) STRESS CONCERN      10/22/2024   Nutrition   Three or more servings of calcium each day? (!) NO   Diet: Regular (no restrictions)   How many servings of fruit and vegetables per day? (!) 0-1   How many sweetened beverages each day? 0-1            10/22/2024   Exercise   Days per week of moderate/strenous exercise 5 days   Average minutes spent exercising at  this level 50 min            10/22/2024   Social Factors   Frequency of gathering with friends or relatives Once a week   Worry food won't last until get money to buy more No   Food not last or not have enough money for food? No   Do you have housing? (Housing is defined as stable permanent housing and does not include staying ouside in a car, in a tent, in an abandoned building, in an overnight shelter, or couch-surfing.) Yes   Are you worried about losing your housing? No   Lack of transportation? No   Unable to get utilities (heat,electricity)? No            10/22/2024   Fall Risk   Fallen 2 or more times in the past year? No   Trouble with walking or balance? No             10/22/2024   Dental   Dentist two times every year? Yes            10/22/2024   TB Screening   Were you born outside of the [place]? No              Today's PHQ-2 Score:       1/15/2024    12:52 PM   PHQ-2 ( 1999 [employer])   Q1: Little interest or pleasure in doing things 0   Q2: Feeling down, depressed or hopeless 0   PHQ-2 Score 0   Q1: Little interest or pleasure in doing things Not at all   Q2: Feeling down, depressed or hopeless Not at all   PHQ-2 Score 0         10/22/2024   Substance Use   Alcohol more than 3/day or more than 7/wk No   Do you use any other substances recreationally? No        Social History     Tobacco Use    Smoking status: Never    Smokeless tobacco: Never   Vaping Use    Vaping status: Never Used   Substance Use Topics    Alcohol use: Yes     Comment: ocas    Drug use: No           10/22/2024   STI Screening   New sexual partner(s) since last STI/HIV test? No      Last PSA:   PSA   Date Value Ref Range Status   10/28/2020 1.00 0 - 4 ug/L Final     Comment:     Assay Method:  Chemiluminescence using Siemens Vista analyzer     Prostate Specific Antigen Screen   Date Value Ref Range Status   10/17/2023 0.71 0.00 - 4.50 ng/mL Final   10/07/2022 1.52 0.00 - 4.00 ug/L Final     ASCVD Risk   The 10-year ASCVD risk score  "(Jessica LOPEZ, et al., 2019) is: 11.4%    Values used to calculate the score:      Age: 59 years      Sex: Male      Is Non- : No      Diabetic: No      Tobacco smoker: No      Systolic Blood Pressure: 148 mmHg      Is BP treated: No      HDL Cholesterol: 64 mg/dL      Total Cholesterol: 232 mg/dL           Reviewed and updated as needed this visit by Provider                    Lab work is in process  Labs reviewed in EPIC      Review of Systems  Constitutional, HEENT, cardiovascular, pulmonary, GI, , musculoskeletal, neuro, skin, endocrine and psych systems are negative, except as otherwise noted.     Objective    Exam  BP (!) 148/93 (BP Location: Left arm, Cuff Size: Adult Regular)   Pulse 62   Temp 96.8  F (36  C) (Tympanic)   Resp 14   Ht 1.746 m (5' 8.75\")   Wt 75.6 kg (166 lb 9.6 oz)   SpO2 100%   BMI 24.78 kg/m     Estimated body mass index is 24.78 kg/m  as calculated from the following:    Height as of this encounter: 1.746 m (5' 8.75\").    Weight as of this encounter: 75.6 kg (166 lb 9.6 oz).    Physical Exam  GENERAL: alert and no distress  EYES: Eyes grossly normal to inspection, PERRL and conjunctivae and sclerae normal  HENT: ear canals and TM's normal, nose and mouth without ulcers or lesions  NECK: no adenopathy, no asymmetry, masses, or scars  RESP: lungs clear to auscultation - no rales, rhonchi or wheezes  CV: regular rate and rhythm, normal S1 S2, no S3 or S4, no murmur, click or rub, no peripheral edema  ABDOMEN: soft, nontender, no hepatosplenomegaly, no masses and bowel sounds normal  MS: no gross musculoskeletal defects noted, no edema  SKIN: no suspicious lesions or rashes  NEURO: Normal strength and tone, mentation intact and speech normal  PSYCH: mentation appears normal, affect normal/bright        Signed Electronically by: Elgin Patel MD    "

## 2024-10-24 LAB
TESTOST FREE SERPL-MCNC: 14.86 NG/DL
TESTOST SERPL-MCNC: 662 NG/DL (ref 240–950)

## 2024-11-12 ENCOUNTER — PATIENT OUTREACH (OUTPATIENT)
Dept: ONCOLOGY | Facility: CLINIC | Age: 59
End: 2024-11-12
Payer: COMMERCIAL

## 2024-11-12 DIAGNOSIS — Z80.9 FAMILY HISTORY OF CANCER: Primary | ICD-10-CM

## 2024-11-12 NOTE — PROGRESS NOTES
Writer received referral to Cancer Risk Management/Genetic Counseling.    Referred for:    genetic counseling referral for Fhx of cancer - breast    Referred By    Provider Department Location Phone   Elgin Patel MD [facility] [phone]       Referral reviewed for appropriate plan, and sent to New Patient Scheduling ([phone]) for completion.    Kacie Guerra RN, BSN  Oncology New Patient Nurse Navigator   [facility]

## 2024-11-14 DIAGNOSIS — Z00.00 ENCOUNTER FOR PREVENTATIVE ADULT HEALTH CARE EXAMINATION: ICD-10-CM

## 2024-11-14 RX ORDER — NORTRIPTYLINE HYDROCHLORIDE 10 MG/1
10 CAPSULE ORAL AT BEDTIME
Qty: 30 CAPSULE | Refills: 0 | Status: SHIPPED | OUTPATIENT
Start: 2024-11-14

## 2024-11-27 DIAGNOSIS — R79.89 LOW TESTOSTERONE: ICD-10-CM

## 2024-11-27 DIAGNOSIS — N40.1 BENIGN PROSTATIC HYPERPLASIA WITH URINARY HESITANCY: ICD-10-CM

## 2024-11-27 DIAGNOSIS — R39.11 BENIGN PROSTATIC HYPERPLASIA WITH URINARY HESITANCY: ICD-10-CM

## 2024-11-27 RX ORDER — FINASTERIDE 5 MG/1
TABLET, FILM COATED ORAL
Qty: 90 TABLET | Refills: 2 | Status: SHIPPED | OUTPATIENT
Start: 2024-11-27

## 2024-11-27 RX ORDER — TESTOSTERONE 20.25 MG/1.25G
GEL TOPICAL
Qty: 150 G | Refills: 0 | Status: SHIPPED | OUTPATIENT
Start: 2024-11-27

## 2024-12-26 ENCOUNTER — TRANSFERRED RECORDS (OUTPATIENT)
Dept: HEALTH INFORMATION MANAGEMENT | Facility: CLINIC | Age: 59
End: 2024-12-26
Payer: COMMERCIAL

## 2025-01-22 DIAGNOSIS — R35.1 BENIGN PROSTATIC HYPERPLASIA WITH NOCTURIA: ICD-10-CM

## 2025-01-22 DIAGNOSIS — N52.9 ERECTILE DYSFUNCTION, UNSPECIFIED ERECTILE DYSFUNCTION TYPE: ICD-10-CM

## 2025-01-22 DIAGNOSIS — N40.1 BENIGN PROSTATIC HYPERPLASIA WITH NOCTURIA: ICD-10-CM

## 2025-01-22 RX ORDER — TADALAFIL 5 MG/1
5 TABLET ORAL DAILY
Qty: 90 TABLET | Refills: 2 | Status: SHIPPED | OUTPATIENT
Start: 2025-01-22

## 2025-01-27 DIAGNOSIS — E78.00 HYPERCHOLESTEREMIA: ICD-10-CM

## 2025-01-27 RX ORDER — SIMVASTATIN 20 MG
20 TABLET ORAL AT BEDTIME
Qty: 90 TABLET | Refills: 2 | Status: SHIPPED | OUTPATIENT
Start: 2025-01-27

## 2025-03-24 DIAGNOSIS — R79.89 LOW TESTOSTERONE: ICD-10-CM

## 2025-03-24 RX ORDER — TESTOSTERONE 20.25 MG/1.25G
GEL TOPICAL
Qty: 150 G | Refills: 0 | Status: SHIPPED | OUTPATIENT
Start: 2025-03-24

## 2025-05-05 ENCOUNTER — TRANSFERRED RECORDS (OUTPATIENT)
Dept: HEALTH INFORMATION MANAGEMENT | Facility: CLINIC | Age: 60
End: 2025-05-05

## 2025-05-05 ENCOUNTER — VIRTUAL VISIT (OUTPATIENT)
Dept: ONCOLOGY | Facility: CLINIC | Age: 60
End: 2025-05-05
Attending: INTERNAL MEDICINE
Payer: COMMERCIAL

## 2025-05-05 DIAGNOSIS — Z80.0 FAMILY HISTORY OF COLON CANCER: ICD-10-CM

## 2025-05-05 DIAGNOSIS — Z84.81 FAMILY HISTORY OF CARRIER OF GENETIC DISEASE: Primary | ICD-10-CM

## 2025-05-05 DIAGNOSIS — Z80.3 FAMILY HISTORY OF MALIGNANT NEOPLASM OF BREAST: ICD-10-CM

## 2025-05-05 DIAGNOSIS — Z80.41 FAMILY HISTORY OF MALIGNANT NEOPLASM OF OVARY: ICD-10-CM

## 2025-05-05 PROCEDURE — 96041 GENETIC COUNSELING SVC EA 30: CPT | Mod: GT,95 | Performed by: GENETIC COUNSELOR, MS

## 2025-05-05 NOTE — NURSING NOTE
Current patient location: [address]  [place] [address]      Is the patient currently in the state of [place]? YES    Visit mode:VIDEO    If the visit is dropped, the patient can be reconnected by: VIDEO VISIT: Send to e-mail at: [email or web address]    Will anyone else be joining the visit? NO  (If patient encounters technical issues they should call [phone]    How would you like to obtain your AVS? MyChart    Are changes needed to the allergy or medication list? N/A    Reason for visit: Consult      Marisol LORA

## 2025-05-05 NOTE — PROGRESS NOTES
5/5/2025  Virtual Visit Details    Type of service:  Video Visit   Video Start Time: 11:54am  Video End Time: 12:37pm  Originating Location (pt. Location): Home  Distant Location (provider location):  Off-site  Platform used for Video Visit: Nito    Referring Provider: Elgin Patel MD    Present for Today's Visit: Anant    Presenting Information:   I met with Anant Mena today for genetic counseling (video visit) to discuss his family history of cancer.  He is here today to review this history, cancer screening recommendations, and available genetic testing options.    Personal History:  Anant is a 59 year old male. He does not have any personal history of cancer.      Most recent colonoscopy in January 2020 resulted in the removal of one 4mm tubular adenoma and follow-up was recommended in 7 years. His most recent PSA in October 2024 was within normal limits. He does not regularly do any other cancer screening at this time.  Anant reported no tobacco use, and about 4 drinks per week for alcohol use.    Family History: Cancer family history and pertinent information reviewed below (Please see scanned pedigree for detailed family history information)  Sister, age 64, has a history of breast cancer diagnosed in her 40's. She was more recently diagnosed with a colon cancer in her early 60's. She underwent testing about 15 years ago through [facility] for mutations in the BRCA1/2 genes and tested positive for a mutation in the BRCA1 gene called c.2530delAG. This sister has a daughter who is BRCA1 positive and was diagnosed with a stage 1 ovarian cancer around age 40.   Sister, age 65, has a history of breast cancer diagnosed in her 40's. She is also reportedly positive for the familial BRCA1 mutation.   Sister passed at age 67 with a history of ovarian cancer diagnosed at age 63. She underwent genetic testing and was reportedly negative for the familial BRCA1 mutation but did test positive for a mutation in the BIANCA  gene.   Sister, age 55, has no personal history of cancer and has reportedly tested positive for the familial BRCA1 mutation. She has undergone risk reducing surgeries.   Mother passed at aeg 81, and it was determined after she passed that she had a breast cancer.   Maternal uncle passed in his 50's from a throat cancer. He was a smoker.   Maternal grandfather passed in his 50's from an unspecified type of cancer.  There is no known Ashkenazi [employer]an ancestry on either side of his family. There is no reported consanguinity.    Discussion:  Anant has a family history of breast and ovarian cancer with an identified BRCA1 mutation three of his sisters and and BIANCA mutation in one of his sisters.   We reviewed the features of sporadic, familial, and hereditary cancers. We discussed the natural history and genetics of hereditary cancers. A detailed handout regarding the information we discussed will be sent to Anant via [employer]. Topics included: inheritance pattern, cancer risks, cancer screening recommendations, and also risks, benefits and limitations of testing.  We reviewed his 65-year-old sister's genetic testing result from 2010 that showed a BRCA1 mutation called c.2530delAG. We reviewed the autosomal dominant and autosomal recessive inheritance patterns of the BRCA1 gene associated with Hereditary Breast and Ovarian Cancer syndrome and Fanconi anemia, respectively. We reviewed that he has a 50% chance of carrying the same BRCA1 mutation identified in his sisters. We reviewed BRCA1-related risks and screening recommendations.   We reviewed his other sister's genetic testing that reportedly showed a mutation in the BIANCA. He did not have a copy of this sister's results for review. We reviewed the autosomal dominant and autosomal recessive inheritance patterns of the BIANCA gene associated with increased risk for breast, ovarian, and pancreatic cancers and ataxia telangectasia, respectively. We reviewed BIANCA-related risks and  screening recommendations.   We reviewed that the BRCA1 and BIANCA mutations identified in his family came from either his mother or father. They have passed and are not able to be tested to confirm this, so it remains possible (though unlikely) that his mother's breast cancer history is unexplained. Given this and the  unknown cancer for his maternal grandfather at a relatively young age, it would be reasonable for Anant to consider more comprehensive genetic testing.   Based on his family history, Anant meets current National Comprehensive Cancer Network (NCCN) criteria for genetic testing of BRCA1 and BIANCA.    We discussed that there are additional genes that could cause increased risk for the cancers we've seen in Anant's family. As many of these genes present with overlapping features in a family and accurate cancer risk cannot always be established based upon the pedigree analysis alone, it would be reasonable for Anant to consider panel genetic testing to analyze multiple genes at once.  We reviewed genetic testing options given Anant's family history including targeted and expanded options. After our discussion, Anant opted to proceed with genetic testing via the Breast and Gyn Cancers panel through [facility].   Medical Management: For Anant, we reviewed that the information from genetic testing may determine:  additional cancer screening for which Anant may qualify (i.e. clinical breast exams/mammograms, more frequent colonoscopies, more frequent dermatologic exams, etc.),  and targeted chemotherapies for Anant if he were to develop certain cancers in the future (i.e. immunotherapy for individuals with Quintanilla syndrome, PARP inhibitors, etc.).   The possible outcomes of genetic testing including positive, negative, and uncertain were discussed with Anant. A handout that explains this in detail was provided to the patient.  Verbal consent obtained over video today with no witness required.   These recommendations will be  discussed in detail once genetic testing is completed.   Anant opted to have a salivia kit sent to his home to complete the genetic testing. A kit was ordered from [employer] and a saliva collection kit will be sent to Anant's home address. I confirmed with Anant that the address we have on file is his current and correct address. We discussed some of the limitations of completing genetic testing via saliva and Anant was instructed to follow the instruction provided in the kit to ensure the best possibility of success for saliva genetic testing.     I spent 55 minutes on the date of the encounter doing chart review, history and exam, documentation and further activities as noted above.    Plan:  1) Today Anant elected to proceed with Breast and Gyn Cancers panel genetic testing through [facility].  2) This information should be available in approximately 3 weeks from the time the lab receives the sample.  3) Anant will be contacted by our scheduling department to set up a virtual result disclosure appointment.     Abbie Shields MS, [facility] [place]  Licensed, Certified Genetic Counselor  [facility]  [email or web address]

## 2025-05-05 NOTE — LETTER
5/5/2025      Anant Mena  [address]  [place] [id number]      Dear Colleague,    Thank you for referring your patient, Anant Mena, to the [facility]. Please see a copy of my visit note below.    5/5/2025  Virtual Visit Details    Type of service:  Video Visit   Video Start Time: 11:54am  Video End Time: 12:37pm  Originating Location (pt. Location): Home  Distant Location (provider location):  Off-site  Platform used for Video Visit: [facility]    Referring Provider: Elgin Patel MD    Present for Today's Visit: Anant    Presenting Information:   I met with Anant Mena today for genetic counseling (video visit) to discuss his family history of cancer.  He is here today to review this history, cancer screening recommendations, and available genetic testing options.    Personal History:  Anant is a 59 year old male. He does not have any personal history of cancer.      Most recent colonoscopy in January 2020 resulted in the removal of one 4mm tubular adenoma and follow-up was recommended in 7 years. His most recent PSA in October 2024 was within normal limits. He does not regularly do any other cancer screening at this time.  Anant reported no tobacco use, and about 4 drinks per week for alcohol use.    Family History: Cancer family history and pertinent information reviewed below (Please see scanned pedigree for detailed family history information)  Sister, age 64, has a history of breast cancer diagnosed in her 40's. She was more recently diagnosed with a colon cancer in her early 60's. She underwent testing about 15 years ago through [facility] for mutations in the BRCA1/2 genes and tested positive for a mutation in the BRCA1 gene called c.2530delAG. This sister has a daughter who is BRCA1 positive and was diagnosed with a stage 1 ovarian cancer around age 40.   Sister, age 65, has a history of breast cancer diagnosed in her 40's. She is also reportedly positive for  the familial BRCA1 mutation.   Sister passed at age 67 with a history of ovarian cancer diagnosed at age 63. She underwent genetic testing and was reportedly negative for the familial BRCA1 mutation but did test positive for a mutation in the BIANCA gene.   Sister, age 55, has no personal history of cancer and has reportedly tested positive for the familial BRCA1 mutation. She has undergone risk reducing surgeries.   Mother passed at aeg 81, and it was determined after she passed that she had a breast cancer.   Maternal uncle passed in his 50's from a throat cancer. He was a smoker.   Maternal grandfather passed in his 50's from an unspecified type of cancer.  There is no known Ashkenazi [employer] ancestry on either side of his family. There is no reported consanguinity.    Discussion:  Anant has a family history of breast and ovarian cancer with an identified BRCA1 mutation three of his sisters and and BIANCA mutation in one of his sisters.   We reviewed the features of sporadic, familial, and hereditary cancers. We discussed the natural history and genetics of hereditary cancers. A detailed handout regarding the information we discussed will be sent to Anant via [employer]. Topics included: inheritance pattern, cancer risks, cancer screening recommendations, and also risks, benefits and limitations of testing.  We reviewed his 65-year-old sister's genetic testing result from 2010 that showed a BRCA1 mutation called [id number]. We reviewed the autosomal dominant and autosomal recessive inheritance patterns of the BRCA1 gene associated with Hereditary Breast and Ovarian Cancer syndrome and Fanconi anemia, respectively. We reviewed that he has a 50% chance of carrying the same BRCA1 mutation identified in his sisters. We reviewed BRCA1-related risks and screening recommendations.   We reviewed his other sister's genetic testing that reportedly showed a mutation in the BIANCA. He did not have a copy of this sister's results for  review. We reviewed the autosomal dominant and autosomal recessive inheritance patterns of the BIANCA gene associated with increased risk for breast, ovarian, and pancreatic cancers and ataxia telangectasia, respectively. We reviewed BIANCA-related risks and screening recommendations.   We reviewed that the BRCA1 and BIANCA mutations identified in his family came from either his mother or father. They have passed and are not able to be tested to confirm this, so it remains possible (though unlikely) that his mother's breast cancer history is unexplained. Given this and the  unknown cancer for his maternal grandfather at a relatively young age, it would be reasonable for Anant to consider more comprehensive genetic testing.   Based on his family history, Anant meets current National Comprehensive Cancer Network (NCCN) criteria for genetic testing of BRCA1 and BIANCA.    We discussed that there are additional genes that could cause increased risk for the cancers we've seen in Anant's family. As many of these genes present with overlapping features in a family and accurate cancer risk cannot always be established based upon the pedigree analysis alone, it would be reasonable for Anant to consider panel genetic testing to analyze multiple genes at once.  We reviewed genetic testing options given Anant's family history including targeted and expanded options. After our discussion, Anant opted to proceed with genetic testing via the Breast and Gyn Cancers panel through [facility].   Medical Management: For Anant, we reviewed that the information from genetic testing may determine:  additional cancer screening for which Anant may qualify (i.e. clinical breast exams/mammograms, more frequent colonoscopies, more frequent dermatologic exams, etc.),  and targeted chemotherapies for Anant if he were to develop certain cancers in the future (i.e. immunotherapy for individuals with Quintanilla syndrome, PARP inhibitors, etc.).   The possible outcomes of  genetic testing including positive, negative, and uncertain were discussed with Anant. A handout that explains this in detail was provided to the patient.  Verbal consent obtained over video today with no witness required.   These recommendations will be discussed in detail once genetic testing is completed.   Anant opted to have a salivia kit sent to his home to complete the genetic testing. A kit was ordered from [email or web address] and a saliva collection kit will be sent to Anant's home address. I confirmed with Anant that the address we have on file is his current and correct address. We discussed some of the limitations of completing genetic testing via saliva and Anant was instructed to follow the instruction provided in the kit to ensure the best possibility of success for saliva genetic testing.     I spent 55 minutes on the date of the encounter doing chart review, history and exam, documentation and further activities as noted above.    Plan:  1) Today Anant elected to proceed with Breast and Gyn Cancers panel genetic testing through [facility].  2) This information should be available in approximately 3 weeks from the time the lab receives the sample.  3) Anant will be contacted by our scheduling department to set up a virtual result disclosure appointment.     Abbie Shields MS, [facility] [place]  Licensed, Certified Genetic Counselor  [facility]  [email or web address]        Note created in error duplicate not      Again, thank you for allowing me to participate in the care of your patient.        Sincerely,        Abbie Reed GC    Electronically signed

## 2025-05-05 NOTE — PATIENT INSTRUCTIONS
Assessing Cancer Risk  Cancer is a common diagnosis which impacts many families.  Individuals may develop cancer due to environmental factors (such as exposures and lifestyle), aging, genetic predisposition, or a combination of these factors.      Only about 5-10% of cancers are thought to be due to an inherited cancer susceptibility gene.    These families often have:  Several people with the same or related types of cancer  Cancers diagnosed at a young age (before age 50)  Individuals with more than one primary cancer  Multiple generations of the family affected with cancer    Comprehensive Breast and Gynecologic Cancer Panel  We each inherit two copies of every gene in our bodies: one from our mother, and one from our father. Each gene has a specific job to do.  When a gene has a mistake or  mutation  in it, it does not work like it should.     Some people may be candidates for genetic testing of more than one gene.  For these families, genetic testing using a cancer panel may be offered. These panels will test different genes at once known to increase the risk for breast, ovarian, uterine, and/or other cancers.    This handout will review common hereditary breast and gynecologic cancer syndromes. The genes that will be discussed in this handout are: BIANCA, BRCA1, BRCA2, BRIP1, CDH1, CHEK2, MLH1, MSH2, MSH6, PMS2, EPCAM, PTEN, PALB2, RAD51C, RAD51D, and TP53.    The purpose of this handout is to serve as a brief summary of the breast and gynecologic cancer risk genes that have published clinical management guidelines for individuals who are found to carry a mutation. Inheriting a mutation does not mean a person will develop cancer, but it does significantly increase their risk above the general population risk.     ______________________________________________________________________________    Hereditary Breast and Ovarian Cancer Syndrome (BRCA1 and BRCA2)  A single mutation in one of the copies of BRCA1 or  BRCA2 increases the risk for breast and ovarian cancer, among others.  The risk for pancreatic cancer and melanoma may also be slightly increased in some families.  The chart below shows the chance that someone with a BRCA mutation would develop cancer in his or her lifetime1,2,3,4.       Lifetime Cancer Risks    General Population BRCA1  BRCA2   Breast  12% >60% >60%   Ovarian  1-2% 39-58% 13-29%   Prostate 12% 7-26% 19-61%   Male Breast 0.1% 0.2-1.2% 1.8-7.1%   Pancreas 1-2% Up to 5% 5-10%     A person s ethnic background is also important to consider, as individuals of Ashkenazi Mormonism ancestry have a higher chance of having a BRCA gene mutation.  There are three BRCA mutations that occur more frequently in this population.      Quintanilla Syndrome (MLH1, MSH2, MSH6, PMS2, and EPCAM)  Currently five genes are known to cause Quintanilla Syndrome: MLH1, MSH2, MSH6, PMS2, and EPCAM.  A single mutation in one of the Quintanilla Syndrome genes increases the risk for colon, endometrial, ovarian, and stomach cancers.  Other cancers that occur less commonly in Quintanilla Syndrome include urinary tract, skin, and brain cancers.  The chart below shows the chance that a person with Quintanilla syndrome would develop cancer in his or her lifetime5.      Lifetime Cancer Risks    General Population Quintanilla Syndrome   Colon 5% 10-61%   Endometrial 3% 13-57%   Ovarian 1-2% 1-38%   Stomach <1% 1-9%   *Cancer risk varies depending on Quintanilla syndrome gene found      Cowden Syndrome (PTEN)  Cowden syndrome is a hereditary condition that increases the risk for breast, thyroid, endometrial, colon, and kidney cancer.  Cowden syndrome is caused by a mutation in the PTEN gene.  A single mutation in one of the copies of PTEN causes Cowden syndrome and increases cancer risk.  The chart below shows the chance that someone with a PTEN mutation would develop cancer in their lifetime6,7.  Other benign features seen in some individuals with Cowden syndrome include benign  skin lesions (facial papules, keratoses, lipomas), learning disability, autism, thyroid nodules, colon polyps, and larger head size.     Lifetime Cancer Risks    General Population Cowden   Breast 12% 40-60%*   Thyroid 1% Up to 38%   Renal 1-2% Up to 35%   Endometrial 3% Up to 28%   Colon 5% Up to 9%   Melanoma 2-3% Up to 6%   *Emerging data suggests the risk for breast cancer could be greater than 60%               Li-Fraumeni Syndrome (TP53)  Li-Fraumeni Syndrome (LFS) is a cancer predisposition syndrome caused by a mutation in the TP53 gene. A single mutation in one of the copies of TP53 increases the risk for multiple cancers. Individuals with LFS are at an increased risk for developing cancer at a young age. The lifetime risk for development of a LFS-associated cancer is 50% by age 30 and 90% by age 60.   Core Cancers: Sarcomas, Breast, Brain, Lung, Leukemias/Lymphomas, Adrenocortical carcinomas  Other Cancers: Gastrointestinal, Thyroid, Skin, Genitourinary       Hereditary Diffuse Gastric Cancer (CDH1)  Currently, one gene is known to cause hereditary diffuse gastric cancer (HDGC): CDH1.  Individuals with HDGC are at increased risk for diffuse gastric cancer and lobular breast cancer. Of people diagnosed with HDGC, 30-50% have a mutation in the CDH1 gene.  This suggests there are likely other genes that may cause HDGC that have not been identified yet.      Lifetime Cancer Risks    General Population HDGC   Diffuse Gastric  <1% ~80%   Breast 12% 41-60%       Additional Genes    BIANCA  BIANCA is a moderate-risk breast cancer gene. Women who have a mutation in BIANCA can have between a 2-4 fold increased risk for breast cancer compared to the general population8. BIANCA mutations have also been associated with increased risk for pancreatic cancer between 5-10%9. Individuals who inherit two BIANCA mutations have a condition called ataxia-telangiectasia (AT).  This rare autosomal recessive condition affects the nervous system  and immune system, and is associated with progressive cerebellar ataxia beginning in childhood. Individuals with ataxia-telangiectasia often have a weakened immune system and have an increased risk for childhood cancers.    PALB2  Mutations in PALB2 have been shown to increase the risk of breast cancer up to 41-60% in some families; where individuals fall within this risk range is dependent upon family [id number]. PALB2 mutations have also been associated with increased risk for pancreatic cancer between 5-10%.  Individuals who inherit two PALB2 mutations--one from their mother and one from their father--have a condition called Fanconi Anemia.  This rare autosomal recessive condition is associated with short stature, developmental delay, bone marrow failure, and increased risk for childhood cancers.    CHEK2   CHEK2 is a moderate-risk breast cancer gene.  Women who have a mutation in CHEK2 have around a 2-4 fold increased risk for breast cancer compared to the general population, and this risk may be higher depending upon family history.11,12,13 The risk of colon cancer may be twice as high as the general population risk of colon cancer of 5%. Mutations in CHEK2 have also been shown to increase the risk of other cancers, including prostate, however these cancer risks are currently not well understood.    BRIP1, RAD51C and RAD51D  Mutations in RAD51C and RAD51D have been shown to increase the risk of ovarian cancer and breast cancer 14,. Mutations in BRIP1 have been shown to increase the risk of ovarian cancer and possibly female breast cancer 15 .       Lifetime Cancer Risk    General Population        BRIP1   RAD51C  RAD51D   Breast 12% Not well defined 20-40% 20-40%   Ovarian 1-2% 5-15% 10-15% 10-20%     ______________________________________________________________  Inheritance  All of the cancer syndromes reviewed above are inherited in an autosomal dominant pattern.  This means that if a parent has a mutation,  each of their children will have a 50% chance of inheriting that same mutation. Therefore, each child --male or female-- would have a 50% chance of being at increased risk for developing cancer.    Image obtained from Genetics Home Reference, 2013     Mutations in some genes can occur de alvarado, which means that a person s mutation occurred for the first time in them and was not inherited from a parent.  Now that they have the mutation, however, it can be passed on to future generations.    Genetic Testing  Genetic testing involves a blood test and will look for any harmful mutations that are associated with increased cancer risk.  If possible, it is recommended that the person(s) who has had cancer be tested before other family members.  That person will give us the most useful information about whether or not a specific gene is associated with the cancer in the family.    Results  There are three possible results of genetic testing:  Positive--a harmful mutation was identified in one or more of the genes  Negative--no mutations were identified in any of the genes tested  Variant of unknown significance--a variation in one of the genes was identified, but it is unclear how this impacts cancer risk in the family    Advantages and Disadvantages   There are advantages and disadvantages to genetic testing.    Advantages  May clarify your cancer risk  Can help you make medical decisions  May explain the cancers in your family  May give useful information to your family members (if you share your results)    Disadvantages  Possible negative emotional impact of learning about inherited cancer risk  Uncertainty in interpreting a negative test result in some situations  Possible genetic discrimination concerns (see below)    Genetic Information Nondiscrimination Act (NIGEL)  The Genetic Information Nondiscrimination Act of 2008 (NIGEL) is a federal law that protects individuals from health insurance or employment discrimination  based on a genetic test result alone (with some exceptions, including employers with fewer than 15 employees, and ).  Although rare, NIGEL  does not cover discrimination protections in terms of life insurance, long term care, or disability insurances.  Visit the [employer] website to learn more.    Reducing Cancer Risk  All of the genes described in this handout have nationally recognized cancer screening guidelines that would be recommended for individuals who test positive.  In addition to increased cancer screening, surgeries may be offered or recommended to reduce cancer risk.  Recommendations are based upon an individual s genetic test result as well as their personal and family history of cancer.    Questions to Think About Regarding Genetic Testing:  What effect will the test result have on me and my relationship with my family members if I have an inherited gene mutation?  If I don t have a gene mutation?  Should I share my test results, and how will my family react to this news, which may also affect them?  Are my children ready to learn new information that may one day affect their own health?    Hereditary Cancer Resources    FORCE: Facing Our Risk of Cancer Empowered [email or web address]   Bright Pink [email or web address]   Li-Fraumeni Syndrome Association [email or web address]   PTEN World PTENworld.com   No stomach for cancer, Inc. [email or web address]   Stomach cancer relief network Scrnet.org   Collaborative Group of the Americas on Inherited Colorectal Cancer (CGA) cgaicc.com    Cancer Care cancercare.[email or web address]   American Cancer Society (ACS) cancer.org   National Cancer [employer] (NCI) cancer.gov     Please call us if you have any questions or concerns.   Cancer Risk Management Program [phone] ([phone])    References  Myrna Fraga PDP, Jean S, Radha ESPITIA, Aleks TSANG, Akash CRAIN, Kim N, Raymon H, Geovanna O, Trent A, Michael B, Avery P, Judd S,  Rayshawn DM, Philip N, Laya E, Casandra H, Isacc E, Asher J, Tomi J, Kevin B, Claudette H, Timci S, Eehafsaa H, Jb H, Frannie K, Katherine OP. Average risks of breast and ovarian cancer associated with BRCA1 or BRCA2 mutations detected in case series unselected for family history: a combined analysis of 222 studies. Am J Hum Tonya. 2003;72:1117-30.  Socrates N, Katt GATICA, Agusto G.  BRCA1 and BRCA2 Hereditary Breast and Ovarian Cancer. Gene Reviews online. 2013.  Tra YC, Sinan S, Karthik G, Temple S. Breast cancer risk among male BRCA1 and BRCA2 mutation carriers. J Natl Cancer Inst. 2007;99:1811-4.  Arthur FRANKLIN, Power I, Rakesh J, Nick E, Singh ER, Ammon F. Risk of breast cancer in male BRCA2 carriers. J Med Tonya. 2010;47:710-1.  National Comprehensive Cancer Network. Clinical practice guidelines in oncology, colorectal cancer screening. Available online (registration required). 2015.  Sands MH, Farooq J, Africa J, Raymond LA, Orlojessica MS, Eng C. Lifetime cancer risks in individuals with germline PTEN mutations. Clin Cancer Res. 2012;18:400-7.  Evy WOODY. Cowden Syndrome: A Critical Review of the Clinical Literature. J Tonya . 2009:18:13-27.  Naga A, Silvano D, Hal S, Kavitha P, Chagtai T, Kala M, Pj B, Noreen H, Aleksey R, Asya K, Stepan L, rAthur FRANKLIN, Rayshawn HAMMOND, Chirag DF, Grace MR, The Breast Cancer Susceptibility Collaboration ([place]) & Letty N. BIANCA mutations that cause ataxia-telangiectasia are breast cancer susceptibility alleles. Nature Genetics. 2006;38:873-875  Basilio MCNULTY , Carmela Y, Nayely DAVID, Celsa WILSON, Agustín GM , Shandra ML, Lalitinger S, Dickson AG, Syngal S, Heather ML, Fabricio J , Karin R, Trevon SZ, Vincenzo JR, Skye VE, Angelic M, Vogelstein B, Darrius N, Taryn RH, Terri KW, and Maria Guadalupe AP. BIANCA mutations in patients with hereditary pancreatic cancer. Cancer Discover. 2012;2:41-46  Roman HALL et al. Breast-Cancer Risk in Families with  Mutations in PALB2. NEJM. 2014; 371(6):497-506.  CHEK2 Breast Cancer Case-Control Consortium. CHEK2*1100delC and susceptibility to breast cancer: A collaborative analysis involving 10,860 breast cancer cases and 9,065 controls from 10 studies. Am J Hum Tonya, 74 (2004), pp. 6180-4355  Tyrell T, Makayla S, Conor K, et al. Spectrum of Mutations in BRCA1, BRCA2, CHEK2, and TP53 in Families at High Risk of Breast Cancer. RUI. 2006;295(12):7021-8143.   Jesse C, Henri D, Herbert A, et al. Risk of breast cancer in women with a CHEK2 mutation with and without a family history of breast cancer. J Clin Oncol. 2011;29:3351-1388.  Kwasi H, Rika E, Guanaco SJ, et al. Contribution of germline mutations in the RAD51B, RAD51C, and RAD51D genes to ovarian cancer in the population. J Clin Oncol. 2015;33(26):6791-3744. Doi:10.1200/JCO.2015.61.2408.  Devonte T, Darnell DF, Modesto P, et al. Mutations in BRIP1 confer high risk of ovarian cancer. Ruby Tonya. 2011;43(11):2142-6825. doi:10.1038/ng.955.

## 2025-05-07 DIAGNOSIS — R79.89 LOW TESTOSTERONE: ICD-10-CM

## 2025-05-07 RX ORDER — TESTOSTERONE 20.25 MG/1.25G
GEL TOPICAL
Qty: 150 G | Refills: 0 | Status: SHIPPED | OUTPATIENT
Start: 2025-05-07

## 2025-05-28 ENCOUNTER — TRANSFERRED RECORDS (OUTPATIENT)
Dept: HEALTH INFORMATION MANAGEMENT | Facility: CLINIC | Age: 60
End: 2025-05-28
Payer: COMMERCIAL

## 2025-06-02 DIAGNOSIS — R79.89 LOW TESTOSTERONE: ICD-10-CM

## 2025-06-02 RX ORDER — TESTOSTERONE 20.25 MG/1.25G
GEL TOPICAL
Qty: 150 G | Refills: 0 | Status: SHIPPED | OUTPATIENT
Start: 2025-06-02

## 2025-06-11 ENCOUNTER — PATIENT OUTREACH (OUTPATIENT)
Dept: ONCOLOGY | Facility: CLINIC | Age: 60
End: 2025-06-11

## 2025-06-11 ENCOUNTER — VIRTUAL VISIT (OUTPATIENT)
Dept: ONCOLOGY | Facility: CLINIC | Age: 60
End: 2025-06-11
Attending: GENETIC COUNSELOR, MS
Payer: COMMERCIAL

## 2025-06-11 DIAGNOSIS — Z15.09 ATM GENE MUTATION POSITIVE: ICD-10-CM

## 2025-06-11 DIAGNOSIS — Z84.81 FAMILY HISTORY OF CARRIER OF GENETIC DISEASE: ICD-10-CM

## 2025-06-11 DIAGNOSIS — Z15.09 BRCA1 GENE MUTATION POSITIVE: Primary | ICD-10-CM

## 2025-06-11 DIAGNOSIS — Z80.41 FAMILY HISTORY OF MALIGNANT NEOPLASM OF OVARY: ICD-10-CM

## 2025-06-11 DIAGNOSIS — Z80.0 FAMILY HISTORY OF COLON CANCER: ICD-10-CM

## 2025-06-11 DIAGNOSIS — Z15.01 BRCA1 GENE MUTATION POSITIVE: Primary | ICD-10-CM

## 2025-06-11 DIAGNOSIS — Z80.3 FAMILY HISTORY OF MALIGNANT NEOPLASM OF BREAST: ICD-10-CM

## 2025-06-11 DIAGNOSIS — Z15.01 ATM GENE MUTATION POSITIVE: ICD-10-CM

## 2025-06-11 PROCEDURE — 96041 GENETIC COUNSELING SVC EA 30: CPT | Mod: GT,95 | Performed by: GENETIC COUNSELOR, MS

## 2025-06-11 NOTE — LETTER
6/11/2025      Anant Mena  [address]  [facility] [address]      Dear Colleague,    Thank you for referring your patient, Anant Mena, to the [facility]. Please see a copy of my visit note below.    Virtual Visit Details    Type of service:  Video Visit   Video Start Time: 1:12pm  Video End Time: 1:45pm  Originating Location (pt. Location): Home  Distant Location (provider location):  Off-site  Platform used for Video Visit: Well    6/11/2025    Referring Provider: Elgin Patel MD    Presenting Information:   I spoke to Anant via video visit today to review his genetic testing results. He submitted a saliva sample to [facility] on 5/12/2025 and Breast and Gyn Cancers panel testing was ordered. This testing was done because of his family history of a BRCA1 mutation and an BIANCA mutation as well as his family history of multiple cancers.     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 We discussed that this mutation is associated with an increased risk for female breast, ovarian, pancreatic, and prostate cancer.     Anant is POSITIVE for a BRCA1 mutation. Specifically his mutation is called c.2411_2412del (p.[id number]*5). We discussed that this mutation is associated with a diagnosis of Hereditary Breast and Ovarian Cancer syndrome and increased risk for female breast, ovarian, prostate, and pancreatic cancer. This is the same mutation that has been identified in his sisters.     We discussed the impact of this testing on Anant in detail.     Additional Genetic Testing Result: Variant of Uncertain Significance (VUS)   Anant was also found to have a variant of uncertain significance in the BIANCA gene called c.5009C>T (p.[id number]). Please see the end of this clinic note for further details regarding the VUS result.    Of note, Anant is negative for mutations in the BARD1, BRCA2,  "BRIP1, CDH1, CHEK2, DICER1, EPCAM, MLH1, MSH2, MSH6, NF1, PALB2, PMS2, PTEN, RAD51C, RAD51D, SMARCA4, STK11, and TP53 genes. No mutations were found in any of the remaining 19 genes analyzed. This test involved sequencing and deletion/duplication analysis of all genes with the exceptions of EPCAM (deletions/duplications only).    A copy of the test report can be found in the Laboratory tab, dated 5/12/2025, and named \"LABORATORY MISCELLANEOUS ORDER.\" The report is scanned in as a linked document.    BIANCA Cancer Risks:    Individuals with an BIANCA mutation are at increased risk of certain cancers. These risks may be influenced by family history:   The lifetime breast cancer risk for women who carry one BIANCA mutation is approximately 21-24%, compared to 12.8% in the general population. Approximately 1% of women diagnosed with breast cancer carry a variant in the BIANCA gene, with this frequency being higher in women diagnosed at early age.   Lifetime pancreatic cancer risk of 5-10%.   We also discussed the association of BIANCA mutations with increased risk for prostate cancer, however data is still limited in this area. Studies have also suggested that there may be a moderately increased risk for ovarian cancer in carriers of an BIANCA mutation (lifetime risk estimated to be 2-3%, compared to the general population risk of 1-2%), however insufficient evidence is available regarding recommendations for risk reducing surgery (RRSO).  Though no exact lifetime risks are available at this time, there may be an increased risk for other cancers.     We reviewed that the BIANCA gene is currently considered a moderate-risk gene. This means that mutations in this gene increase the risk for certain cancers, but are unlikely to be the single cause for an individual's cancer. There are likely other genetic and/or environmental risk factors that, in combination with the BIANCA gene mutation, may be associated with the cancers in Anant's family.  "   BIANCA Recommendations for Screening and Management:   The following screening is recommended for individuals who carry one BIANCA mutation, per current National Comprehensive Cancer Network (NCCN) guidelines.  Women with BIANCA mutations qualify for high risk breast screening.  High-risk breast cancer screening recommendations: annual mammograms with consideration of tomosynthesis at age 40, and consider breast MRI with and without contrast starting at age 30-35 (mammograms and breast MRIs alternate every 6 months).  There is currently insufficient data regarding the benefit of risk-reducing mastectomy for women who carry an BIANCA mutation. As such, the decision to pursue a risk-reducing mastectomy should be based on both the BIANCA mutation and family history and should be discussed in greater detail with an individual's medical providers.  Screening for pancreatic cancer, including annual contract-enhanced MRI/MRCP and/or EUS, is recommended for individuals with BIANCA mutations. Screening should start at age 50, or 10 years younger than the earliest exocrine pancreatic cancer diagnosis in the family.   Screening for prostate cancer can be considered starting at age 40.  There are currently no published screening recommendations for other cancers, such as ovarian, potentially associated with an BIANCA mutation. Decisions regarding possible risk reduction and screening may be made based on family history.     BRCA1 Cancer Risks:  Breast cancer risk:  Women who carry a BRCA1 pathogenic/likely pathogenic variant have a greater than 60% (60-72%) lifetime risk of developing a primary breast cancer. This is much greater than the general population breast cancer risk of 12%  Male breast cancer risks are also increased: approximately 0.2-1.2% by age 70.    Ovarian cancer risk: 39-58%. Some studies suggest that this risk may be as high as 62%. This is much greater than the general population ovarian cancer risk of 1-2%  Pancreatic cancer  risk: up to 5%   Prostate cancer risk: up to 7-26%, including a higher likelihood for advanced or metastatic disease.     Increased risk for other cancers, including melanoma and serous uterine cancer, may be present. No exact lifetime risks are available at this time.     BRCA1 Cancer Screening and Prevention:  The following recommendations are based on current National Comprehensive Cancer Network guidelines for BRCA1.  Earlier and more frequent breast screening is recommended:  Breast awareness starting at age 18  Clinical breast exams starting at age 25; repeated every 6-12 months  Annual breast MRI from age 25-29  Annual mammograms with consideration of tomosynthesis and breast MRI alternating every 6 months starting at age 30  Consider risk reducing mastectomy, which reduces breast cancer risk by 90%  Screening for male breast cancer includes self breast exams and annual clinical breast exams beginning at age 35  Annual mammogram screening may be considered in men with gynecomastia starting at age 50 or 10 years prior to the earliest male breast cancer in the family  Bilateral salpingo-oophorectomy (removal of both ovaries and fallopian tubes) reduces the risk of ovarian cancer and is typically recommended between ages 35-40, or after child bearing  Consider transvaginal ultrasound and a  blood test starting at age 30-35, though the benefits of this screening are unclear.  Consider non-surgical risk reduction (such as combination estrogen/progestin contraception)  Consideration of salpingectomy with delayed oophorectomy (as part of the SOROCk trial)   Screening for pancreatic cancer may be considered for some families.   Consider pancreatic cancer screening beginning at age 50 (or 10 years younger than the earliest pancreatic cancer diagnosis) for individuals with a family history of pancreatic cancer in a first or second degree relative (who presumably carries the BRCA1 variant).    This screening  typically involves endoscopic ultrasonography (EUS) and/or MRI/magnetic resonance cholangiopancreatography.  Individuals at risk for prostate cancer may consider beginning prostate screening at age 40, as recommended in the NCCN Guidelines for Prostate Early Detection  General melanoma risk management, including annual full body skin exams and safe sun practices are appropriate.      Chemoprevention with Tamoxifen can reduce breast cancer risk in some women.  Some data suggests that there may be an increased risk for serous uterine cancer in women with BRCA1 mutations. However, further research is needed in this area, and data is currently limited. Women with BRCA1 mutations are encouraged to discuss the risks and benefits of hysterectomy at the time of oophorectomy with their provider before surgery. Using oral contraceptives for five years or more has been shown to reduce ovarian cancer risk by around 50%.  The data are still inconclusive as to whether or not using oral contraceptives will increase breast cancer risk in BRCA1 mutation carriers.     Some chemotherapies for certain cancers may be more effective in individuals with BRCA1 mutations. Anant should discuss this with his physicians, if chemotherapy is indicated for him in the future.     Other screening based on Anant's personal and family history:  Per National Comprehensive Cancer Network (NCCN) guidelines, individuals with a first degree relative with colorectal cancer diagnosed at any age should begin colonoscopy at age 40, or 10 years before the earliest diagnosis of colorectal cancer, whichever is first. Colonoscopy should be repeated every 5 years, or based on colonoscopy findings. This would apply to Anant and his siblings. These recommendations may change based on personal and family history as well as personal preference, and should be discussed with an individuals physician.        We discussed that Anant could participate in our Cancer Risk  Management Program in which our nursing specialist provides an individual screening plan and assists with medical management. A referral was placed to see ARLEN Wells for this service.    Of note, the above information is based on our current understanding of Anant's genetic findings. Anant is encouraged to reach out to me regularly regarding any pertinent updates to his personal and/or family history of cancer, as our understanding of the genetic findings in his family may change over time.     Implications for Family Members:  We reviewed that mutations in the BIANCA gene are inherited in an autosomal dominant pattern. This means that each of Anant's children have a 50% chance of inheriting the same mutation. Likewise, each of his siblings has a 50% risk of having the same mutation. Extended relatives may also carry this mutation, and he is encouraged to share this information with his family members on both sides of the family. I am happy to help his relatives connect with a genetic counselor in their area if they would like to discuss testing.    In rare situations in which both parents have a mutation in the BIANCA gene, their children each have a 25% risk for ataxia-telangiectasia. Ataxia-telangiectasia is a rare autosomal recessive disorder that typically presents in childhood and can present with widened blood vessels called telangiectasias, progressive neurologic symptoms, immune deficiency, and increased risk for childhood cancers. For individuals of childbearing age with BIANCA mutations, genetic counseling and genetic testing may be advised for their partners.    We reviewed that mutations in the BRCA1 gene are inherited in an autosomal dominant pattern. This means that each of Anant's children have a 50% chance of inheriting the same mutation. Likewise, each of his siblings has a 50% risk of having the same mutation. Extended relatives may also carry this mutation, and he is encouraged to share this  information with his family members on both sides of the family. I am happy to help his relatives connect with a genetic counselor in their area if they would like to discuss testing.    In rare situations in which both parents have a mutation in the BRCA1 gene, their children each have a 25% risk for Fanconi anemia. Fanconi anemia is a rare condition with onset in childhood that often results in physical abnormalities, growth retardation, bone marrow failure, and increased risk for cancer. For individuals of childbearing age with BRCA1 mutations, genetic counseling and genetic testing may be advised for their partners.    Genetic Testing Result: Variant of Uncertain Significance (VUS)  Anant was also found to have a variant of uncertain significance (VUS) in the BIANCA gene. This variant is called c.5009C>T (p.[id number]). Given the uncertain significance of this result, medical management decisions should NOT be made based on this test result alone.    VUS Interpretation:  We discussed several different interpretations of this inconclusive test result. It is not clear if this variant in the BIANCA gene is associated with increased cancer risk.  1. This variant may be a benign change that does not increase cancer risk.  2. This variant may be a harmful mutation that causes increased risk for certain cancers/tumors.    BIANCA c.5009C>T (p.[id number])  Harmful mutations in the BIANCA gene are associated with increased risk for breast, ovarian, and pancreatic cancer and possibly prostate cancer. In rare situations in which both parents have a mutation in the BIANCA gene, their children each have a 25% risk for ataxia-telangiectasia. Ataxia-telangiectasia is a rare autosomal recessive disorder that typically presents in childhood and can present with widened blood vessels called telangiectasias, progressive neurologic symptoms, immune deficiency, and increased risk for childhood cancers. We discussed that changes to medical management  are NOT recommended for individuals with a VUS result.    The laboratory is working to determine if this variant is harmful or benign, and they will contact me if it is reclassified.    Anant's relatives may also carry this VUS. Because it is unclear what, if any, risk is associated with this variant, clinical genetic testing for this BIANCA variant alone is not recommended for relatives.    Support Resources:  Resources for Men with a BRCA1 or BRCA2 Mutation    FORCE: Facing Our Risk of Cancer Empowered  www.Doist.org  FORCE s mission is to improve the lives of individuals and families affected by hereditary breast, ovarian, and related cancers by creating awareness, supplying information and support to the community, advocating for and supporting research, and working with the research and medical communities.   Helpline: 1-773.378.3499  Message boards  Peer Navigation and local support groups  Information for BRCA+ Men: http://www.facing[employer]uits.org/understanding-brca-and-hboc/information/previvors-survivors/men/basics/overview.php   Talking About BRCA in Your Family Tree  http://www.Doist.org/understanding-brca-and-hboc/publications/documents/qmasqef-dozifdo-mxazm-brca-family.pdf  Prostate Cancer Foundation www.pcf.org/   The Prostate Cancer Foundation was started in 2003. This organization funds research for prevention, detection, and treatment, and hopefully one day a cure for prostate cancer.   Us TOO www.ustoo.org  UsTOO was started by men in [place] in 1990. This is now an international effort to provide education and support to men diagnosed with prostate cancer.  This website also has a prostate cancer support group search function to locate a support group near you.  Pancreatic Cancer Action Network www.pancan.org  The Pancreatic Cancer Action Network aims to fight pancreatic cancer, with a goal of doubling survival by 2020. This organization hosts  [employer]  - a walk to support pancreatic  "cancer research. There are also ways to get involved with this organization through volunteering and advocacy work.    BRCA Information Support Group  People with BRCA1 or BRAC2 mutations face complex emotional challenges and complicated medical decisions due to high cancer risks and their impact on individuals and families. This group brings people with a BRAC1 or BRAC2 genetic diagnosis together with others facing similar challenges. The group meets nine times per year. For information, please contact [email or web address] or call [phone].  [employer] www.[employer].comcities.org  [facility] [place] is a 668(c)3 nonprofit and the local affiliate of the Cancer Support Community, a network of more than 54 supportive, free and welcoming  clubhouses  where everyone living with cancer can come for social, emotional and psychological support. Clubs are healing environments where individuals learn from each other with guidance from licensed professionals.  Books:  Confronting Hereditary Breast and Ovarian Cancer: Identify Your Risk, Understand Your Options, Change Your [place], Helen Kumar    Plan:  1.  I provided Anant with a copy of his test results and support resources today.  2.  I will provide a \"Dear Relative\" letter for Anant to share with his family members.  3.  A referral was placed for Anant to meet with ARLEN Wells to discuss screening associated with BRCA1 and BIANCA mutations.    I spent 33 minutes on the date of the encounter doing chart review, history and exam, documentation and further activities as noted above.    Abbie Shields MS, [facility] [place], [place]  Licensed, Certified Genetic Counselor  [facility]  [email or web address]          Again, thank you for allowing me to participate in the care of your patient.        Sincerely,        Abbie Reed GC    Electronically signed  "

## 2025-06-11 NOTE — PROGRESS NOTES
Virtual Visit Details    Type of service:  Video Visit   Video Start Time: 1:12pm  Video End Time: 1:45pm  Originating Location (pt. Location): Home  Distant Location (provider location):  Off-site  Platform used for Video Visit: Nito    6/11/2025    Referring Provider: Elgin Patel MD    Presenting Information:   I spoke to Anant via video visit today to review his genetic testing results. He submitted a saliva sample to [facility] on 5/12/2025 and Breast and Gyn Cancers panel testing was ordered. This testing was done because of his family history of a BRCA1 mutation and an BIANCA mutation as well as his family history of multiple cancers.     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 We discussed that this mutation is associated with an increased risk for female breast, ovarian, pancreatic, and prostate cancer.     Anant is POSITIVE for a BRCA1 mutation. Specifically his mutation is called c.2411_2412del (p.Xgp393Veule*5). We discussed that this mutation is associated with a diagnosis of Hereditary Breast and Ovarian Cancer syndrome and increased risk for female breast, ovarian, prostate, and pancreatic cancer. This is the same mutation that has been identified in his sisters.     We discussed the impact of this testing on Anant in detail.     Additional Genetic Testing Result: Variant of Uncertain Significance (VUS)   Anant was also found to have a variant of uncertain significance in the BIANCA gene called c.5009C>T (p.[id number]). Please see the end of this clinic note for further details regarding the VUS result.    Of note, Anant is negative for mutations in the BARD1, BRCA2, BRIP1, CDH1, CHEK2, DICER1, EPCAM, MLH1, MSH2, MSH6, NF1, PALB2, PMS2, PTEN, RAD51C, RAD51D, SMARCA4, STK11, and TP53 genes. No mutations were found in any of the remaining 19 genes analyzed. This test involved sequencing and deletion/duplication  "analysis of all genes with the exceptions of EPCAM (deletions/duplications only).    A copy of the test report can be found in the Laboratory tab, dated 5/12/2025, and named \"LABORATORY MISCELLANEOUS ORDER.\" The report is scanned in as a linked document.    BIANCA Cancer Risks:    Individuals with an BIANCA mutation are at increased risk of certain cancers. These risks may be influenced by family history:   The lifetime breast cancer risk for women who carry one BIANCA mutation is approximately 21-24%, compared to 12.8% in the general population. Approximately 1% of women diagnosed with breast cancer carry a variant in the BIANCA gene, with this frequency being higher in women diagnosed at early age.   Lifetime pancreatic cancer risk of 5-10%.   We also discussed the association of BIANCA mutations with increased risk for prostate cancer, however data is still limited in this area. Studies have also suggested that there may be a moderately increased risk for ovarian cancer in carriers of an BIANCA mutation (lifetime risk estimated to be 2-3%, compared to the general population risk of 1-2%), however insufficient evidence is available regarding recommendations for risk reducing surgery (RRSO).  Though no exact lifetime risks are available at this time, there may be an increased risk for other cancers.     We reviewed that the BIANCA gene is currently considered a moderate-risk gene. This means that mutations in this gene increase the risk for certain cancers, but are unlikely to be the single cause for an individual's cancer. There are likely other genetic and/or environmental risk factors that, in combination with the BIANCA gene mutation, may be associated with the cancers in Anant's family.    BIANCA Recommendations for Screening and Management:   The following screening is recommended for individuals who carry one BIANCA mutation, per current National Comprehensive Cancer Network (NCCN) guidelines.  Women with BIANCA mutations qualify for high " risk breast screening.  High-risk breast cancer screening recommendations: annual mammograms with consideration of tomosynthesis at age 40, and consider breast MRI with and without contrast starting at age 30-35 (mammograms and breast MRIs alternate every 6 months).  There is currently insufficient data regarding the benefit of risk-reducing mastectomy for women who carry an BIANCA mutation. As such, the decision to pursue a risk-reducing mastectomy should be based on both the BIANCA mutation and family history and should be discussed in greater detail with an individual's medical providers.  Screening for pancreatic cancer, including annual contract-enhanced MRI/MRCP and/or EUS, is recommended for individuals with BIANCA mutations. Screening should start at age 50, or 10 years younger than the earliest exocrine pancreatic cancer diagnosis in the family.   Screening for prostate cancer can be considered starting at age 40.  There are currently no published screening recommendations for other cancers, such as ovarian, potentially associated with an BIANCA mutation. Decisions regarding possible risk reduction and screening may be made based on family history.     BRCA1 Cancer Risks:  Breast cancer risk:  Women who carry a BRCA1 pathogenic/likely pathogenic variant have a greater than 60% (60-72%) lifetime risk of developing a primary breast cancer. This is much greater than the general population breast cancer risk of 12%  Male breast cancer risks are also increased: approximately 0.2-1.2% by age 70.    Ovarian cancer risk: 39-58%. Some studies suggest that this risk may be as high as 62%. This is much greater than the general population ovarian cancer risk of 1-2%  Pancreatic cancer risk: up to 5%   Prostate cancer risk: up to 7-26%, including a higher likelihood for advanced or metastatic disease.     Increased risk for other cancers, including melanoma and serous uterine cancer, may be present. No exact lifetime risks are  available at this time.     BRCA1 Cancer Screening and Prevention:  The following recommendations are based on current National Comprehensive Cancer Network guidelines for BRCA1.  Earlier and more frequent breast screening is recommended:  Breast awareness starting at age 18  Clinical breast exams starting at age 25; repeated every 6-12 months  Annual breast MRI from age 25-29  Annual mammograms with consideration of tomosynthesis and breast MRI alternating every 6 months starting at age 30  Consider risk reducing mastectomy, which reduces breast cancer risk by 90%  Screening for male breast cancer includes self breast exams and annual clinical breast exams beginning at age 35  Annual mammogram screening may be considered in men with gynecomastia starting at age 50 or 10 years prior to the earliest male breast cancer in the family  Bilateral salpingo-oophorectomy (removal of both ovaries and fallopian tubes) reduces the risk of ovarian cancer and is typically recommended between ages 35-40, or after child bearing  Consider transvaginal ultrasound and a  blood test starting at age 30-35, though the benefits of this screening are unclear.  Consider non-surgical risk reduction (such as combination estrogen/progestin contraception)  Consideration of salpingectomy with delayed oophorectomy (as part of the SOROCk trial)   Screening for pancreatic cancer may be considered for some families.   Consider pancreatic cancer screening beginning at age 50 (or 10 years younger than the earliest pancreatic cancer diagnosis) for individuals with a family history of pancreatic cancer in a first or second degree relative (who presumably carries the BRCA1 variant).    This screening typically involves endoscopic ultrasonography (EUS) and/or MRI/magnetic resonance cholangiopancreatography.  Individuals at risk for prostate cancer may consider beginning prostate screening at age 40, as recommended in the NCCN Guidelines for Prostate  Early Detection  General melanoma risk management, including annual full body skin exams and safe sun practices are appropriate.      Chemoprevention with Tamoxifen can reduce breast cancer risk in some women.  Some data suggests that there may be an increased risk for serous uterine cancer in women with BRCA1 mutations. However, further research is needed in this area, and data is currently limited. Women with BRCA1 mutations are encouraged to discuss the risks and benefits of hysterectomy at the time of oophorectomy with their provider before surgery. Using oral contraceptives for five years or more has been shown to reduce ovarian cancer risk by around 50%.  The data are still inconclusive as to whether or not using oral contraceptives will increase breast cancer risk in BRCA1 mutation carriers.     Some chemotherapies for certain cancers may be more effective in individuals with BRCA1 mutations. Anant should discuss this with his physicians, if chemotherapy is indicated for him in the future.     Other screening based on Anant's personal and family history:  Per National Comprehensive Cancer Network (NCCN) guidelines, individuals with a first degree relative with colorectal cancer diagnosed at any age should begin colonoscopy at age 40, or 10 years before the earliest diagnosis of colorectal cancer, whichever is first. Colonoscopy should be repeated every 5 years, or based on colonoscopy findings. This would apply to Anant and his siblings. These recommendations may change based on personal and family history as well as personal preference, and should be discussed with an individuals physician.        We discussed that Anant could participate in our Cancer Risk Management Program in which our nursing specialist provides an individual screening plan and assists with medical management. A referral was placed to see ARLEN Wells for this service.    Of note, the above information is based on our current  understanding of Anant's genetic findings. Anant is encouraged to reach out to me regularly regarding any pertinent updates to his personal and/or family history of cancer, as our understanding of the genetic findings in his family may change over time.     Implications for Family Members:  We reviewed that mutations in the BIANCA gene are inherited in an autosomal dominant pattern. This means that each of Anant's children have a 50% chance of inheriting the same mutation. Likewise, each of his siblings has a 50% risk of having the same mutation. Extended relatives may also carry this mutation, and he is encouraged to share this information with his family members on both sides of the family. I am happy to help his relatives connect with a genetic counselor in their area if they would like to discuss testing.    In rare situations in which both parents have a mutation in the BIANCA gene, their children each have a 25% risk for ataxia-telangiectasia. Ataxia-telangiectasia is a rare autosomal recessive disorder that typically presents in childhood and can present with widened blood vessels called telangiectasias, progressive neurologic symptoms, immune deficiency, and increased risk for childhood cancers. For individuals of childbearing age with BIANCA mutations, genetic counseling and genetic testing may be advised for their partners.    We reviewed that mutations in the BRCA1 gene are inherited in an autosomal dominant pattern. This means that each of Anant's children have a 50% chance of inheriting the same mutation. Likewise, each of his siblings has a 50% risk of having the same mutation. Extended relatives may also carry this mutation, and he is encouraged to share this information with his family members on both sides of the family. I am happy to help his relatives connect with a genetic counselor in their area if they would like to discuss testing.    In rare situations in which both parents have a mutation in the BRCA1  gene, their children each have a 25% risk for Fanconi anemia. Fanconi anemia is a rare condition with onset in childhood that often results in physical abnormalities, growth retardation, bone marrow failure, and increased risk for cancer. For individuals of childbearing age with BRCA1 mutations, genetic counseling and genetic testing may be advised for their partners.    Genetic Testing Result: Variant of Uncertain Significance (VUS)  Anant was also found to have a variant of uncertain significance (VUS) in the BIANCA gene. This variant is called c.5009C>T (p.[id number]). Given the uncertain significance of this result, medical management decisions should NOT be made based on this test result alone.    VUS Interpretation:  We discussed several different interpretations of this inconclusive test result. It is not clear if this variant in the BIANCA gene is associated with increased cancer risk.  1. This variant may be a benign change that does not increase cancer risk.  2. This variant may be a harmful mutation that causes increased risk for certain cancers/tumors.    BIANCA c.5009C>T (p.[id number])  Harmful mutations in the BIANCA gene are associated with increased risk for breast, ovarian, and pancreatic cancer and possibly prostate cancer. In rare situations in which both parents have a mutation in the BIANCA gene, their children each have a 25% risk for ataxia-telangiectasia. Ataxia-telangiectasia is a rare autosomal recessive disorder that typically presents in childhood and can present with widened blood vessels called telangiectasias, progressive neurologic symptoms, immune deficiency, and increased risk for childhood cancers. We discussed that changes to medical management are NOT recommended for individuals with a VUS result.    The laboratory is working to determine if this variant is harmful or benign, and they will contact me if it is reclassified.    Anant's relatives may also carry this VUS. Because it is unclear what,  if any, risk is associated with this variant, clinical genetic testing for this BIANCA variant alone is not recommended for relatives.    Support Resources:  Resources for Men with a BRCA1 or BRCA2 Mutation    FORCE: Facing Our Risk of Cancer Empowered  www.[employer].Ziippi  FORCE s mission is to improve the lives of individuals and families affected by hereditary breast, ovarian, and related cancers by creating awareness, supplying information and support to the community, advocating for and supporting research, and working with the research and medical communities.   Helpline: 1-338.354.2211  Message boards  Peer Navigation and local support groups  Information for BRCA+ Men: http://www.[employer].org/understanding-brca-and-hboc/information/previvors-survivors/men/basics/overview.php   Talking About BRCA in Your Family Tree  http://www.[employer].org/understanding-brca-and-hboc/publications/documents/qvkhzch-rbmjxtt-pcbmf-brca-family.pdf  Prostate Cancer Foundation www.pcf.org/   The Prostate Cancer Foundation was started in 2003. This organization funds research for prevention, detection, and treatment, and hopefully one day a cure for prostate cancer.   Us TOO www.ustoo.org  UsTOO was started by men in [place] in 1990. This is now an international effort to provide education and support to men diagnosed with prostate cancer.  This website also has a prostate cancer support group search function to locate a support group near you.  Pancreatic Cancer Action Network www.pancan.org  The Pancreatic Cancer Action Network aims to fight pancreatic cancer, with a goal of doubling survival by 2020. This organization hosts  PurpleAutoAlertride  - a walk to support pancreatic cancer research. There are also ways to get involved with this organization through volunteering and advocacy work.    BRCA Information Support Group  People with BRCA1 or BRAC2 mutations face complex emotional challenges and complicated medical decisions  "due to high cancer risks and their impact on individuals and families. This group brings people with a BRAC1 or BRAC2 genetic diagnosis together with others facing similar challenges. The group meets nine times per year. For information, please contact [email or web address] or call [phone].  [employer] www.Procurasclubtwincities.org  [employer] is a 501(c)3 nonprofit and the local affiliate of the Cancer Support Community, a network of more than 54 supportive, free and welcoming  clubhouses  where everyone living with cancer can come for social, emotional and psychological support. Clubs are healing environments where individuals learn from each other with guidance from licensed professionals.  Books:  Confronting Hereditary Breast and Ovarian Cancer: Identify Your Risk, Understand Your Options, Change Your Nyasia, Helen Kumar    Plan:  1.  I provided Anant with a copy of his test results and support resources today.  2.  I will provide a \"Dear Relative\" letter for Anant to share with his family members.  3.  A referral was placed for Anant to meet with ARLEN Wells to discuss screening associated with BRCA1 and BIANCA mutations.    I spent 33 minutes on the date of the encounter doing chart review, history and exam, documentation and further activities as noted above.    Abbie Shields MS, [facility] [place]  Licensed, Certified Genetic Counselor  [facility]  [email or web address]        "

## 2025-06-11 NOTE — PROGRESS NOTES
received Cancer Risk Management Program referral, referred for:    Referral to ESCOBAR Wells CNP for BRCA1 and BIANCA screening and mangement      Reviewed for appropriate plan, and sent to New Patient Scheduling for completion.

## 2025-06-11 NOTE — PATIENT INSTRUCTIONS
6/11/2025    Dear Relative:  The purpose of this letter is to inform you that your relative recently underwent genetic counseling and genetic testing due to the family history of cancer.  The testing done through [facility] identified a mutation in the BIANCA gene.  Specifically, the mutation is called c.1898+2T>G (Splice donor).  The accession number linked to their test result is [id number].   A mutation (or change in the genetic code) causes a specific gene to stop working properly.  Ultimately, individuals who have a mutation in this BIANCA gene have an increased risk for certain types of cancer.  BIANCA mutations increase the lifetime risk of breast cancer to approximately 24% over a woman s lifetime, compared to 12% in the general population. Elevated risks are also present for ovarian and pancreatic cancer and potentially prostate cancer. Both men and women have a 50% chance of passing this mutation on to each of their children.  If individuals are found to have a mutation in the BIANCA gene, we would recommend increased breast cancer screening.  Additional cancer screening recommendations may be made based on family history   In rare situations in which both parents have a mutation in the BIANCA gene, their children each have a 25% risk for ataxia-telangiectasia. Ataxia-telangiectasia is a disorder that affects the nervous system, immune system, and other systems.  Individuals with this disorder have trouble with balance and coordinating movements (called ataxia) beginning in childhood.  Affected individuals also have widened blood vessels called telangiectasias. People with ataxia-telangiectasia often have a weakened immune system and have an increased risk of developing childhood cancers.  For individuals of childbearing age with BIANCA mutations, genetic counseling and genetic testing may be advised for their partners.  I understand that this may be surprising, unexpected, and even scary news.  Because this mutation has been  identified in your family, you are at risk for having the same mutation.  As mentioned earlier, your children may also be at risk.    Scheduling a genetic counseling appointment does not mean you have to undergo genetic testing.  The decision to pursue such testing is a very personal one that is discussed in more detail during the session.  Much of cancer genetic counseling is providing valuable information to individuals who are impacted by genetic information such as this.    If you are interested in scheduling a genetic counseling appointment at [facility], please call [phone] to schedule an appointment. You can also find a genetic counselor close to you or at another health system at [employer]  Sincerely,   Abbie Shields MS, [facility]  Licensed, Certified Genetic Counselor  [facility]    6/11/2025    Dear Relative:  The purpose of this letter is to inform you that your relative recently underwent genetic counseling and genetic testing due to the family history of cancer.  The testing done through [facility] identified a mutation in the BRCA1 gene. Specifically, the mutation is called c.2411_2412del (p.[id number]*5).  The accession number linked to your relative's test report is [id number].  A mutation (or change in the genetic code) causes a specific gene to stop working properly.  Ultimately, individuals who have a mutation in this BRCA1 gene have a diagnosis of hereditary breast and ovarian cancer syndrome.    BRCA1 mutations increase the lifetime risk of breast cancer greater than 60%. They also increase the lifetime risk for ovarian cancer up to 39-58%. Additional cancer risks include prostate, male breast, and pancreatic.  If individuals are found to have a mutation in the BRCA1 gene, we would recommend increased cancer screening at younger ages.  Risk reduction surgeries are also available options that can prevent the development of certain cancers.    Individuals  who have a BRCA1 mutation have a 50% chance of passing this mutation on to each of their children. In rare situations in which both parents have a mutation in the BRCA1 gene, their children each may have a 25% risk for Fanconi anemia. Fanconi anemia is a rare condition with onset in childhood.  Fanconi anemia often results in physical abnormalities, growth retardation, bone marrow failure, and increased risk for cancer.  For individuals of childbearing age with BRCA1 mutations, genetic counseling and genetic testing may be advised for their partners.    I understand that this may be surprising, unexpected, and even scary news. You are at risk for having the same BRCA1 mutation that was found in your family. As mentioned earlier, your children may also be at risk.    Scheduling a genetic counseling appointment does not mean you have to undergo genetic testing. The decision to pursue such testing is a very personal one that is discussed in more detail during the appointment. The role of genetic counseling is to provide valuable information to individuals who are impacted by genetic information such as this.   If you are interested in [facility], please call [phone] to schedule an appointment. You can also find a genetic counselor close to you or at another health system at [employer]  Sincerely,   Abbie Shields MS, [facility] [place] [place]  Licensed, Certified Genetic Counselor  [facility]

## 2025-06-11 NOTE — NURSING NOTE
Current patient location: [id number]  [place] [address]    Is the patient currently in the state of [place]? YES    Visit mode: VIDEO    If the visit is dropped, the patient can be reconnected by:VIDEO VISIT: Text to cell phone:   Telephone Information:   Mobile [phone]       Will anyone else be joining the visit? NO  (If patient encounters technical issues they should call [phone]    Are changes needed to the allergy or medication list? N/A    Are refills needed on medications prescribed by this physician? NO    Rooming Documentation:  Questionnaire(s) not done per department protocol    Reason for visit: RECHECK    Ino HOWEF

## 2025-07-02 DIAGNOSIS — Z00.00 ENCOUNTER FOR PREVENTATIVE ADULT HEALTH CARE EXAMINATION: ICD-10-CM

## 2025-07-02 DIAGNOSIS — R35.1 BENIGN PROSTATIC HYPERPLASIA WITH NOCTURIA: ICD-10-CM

## 2025-07-02 DIAGNOSIS — N52.9 ERECTILE DYSFUNCTION, UNSPECIFIED ERECTILE DYSFUNCTION TYPE: ICD-10-CM

## 2025-07-02 DIAGNOSIS — R79.89 LOW TESTOSTERONE: ICD-10-CM

## 2025-07-02 DIAGNOSIS — N40.1 BENIGN PROSTATIC HYPERPLASIA WITH NOCTURIA: ICD-10-CM

## 2025-07-02 RX ORDER — TADALAFIL 5 MG/1
5 TABLET ORAL DAILY
Qty: 90 TABLET | Refills: 1 | Status: SHIPPED | OUTPATIENT
Start: 2025-07-02

## 2025-07-02 RX ORDER — TESTOSTERONE 20.25 MG/1.25G
3 GEL TOPICAL DAILY
Qty: 225 G | Refills: 1 | Status: SHIPPED | OUTPATIENT
Start: 2025-07-02

## 2025-07-02 RX ORDER — VARDENAFIL HYDROCHLORIDE 10 MG/1
10 TABLET ORAL DAILY PRN
Qty: 90 TABLET | Refills: 1 | Status: SHIPPED | OUTPATIENT
Start: 2025-07-02

## 2025-07-02 NOTE — TELEPHONE ENCOUNTER
Pt using new mail order pharmacy.     Testosterone, Cialis and Levitra.     Last OV on 10/22/24.     Provider approval needed.